# Patient Record
Sex: FEMALE | Race: WHITE | Employment: UNEMPLOYED | URBAN - METROPOLITAN AREA
[De-identification: names, ages, dates, MRNs, and addresses within clinical notes are randomized per-mention and may not be internally consistent; named-entity substitution may affect disease eponyms.]

---

## 2017-04-10 PROCEDURE — 87624 HPV HI-RISK TYP POOLED RSLT: CPT | Performed by: FAMILY MEDICINE

## 2017-04-10 PROCEDURE — 88175 CYTOPATH C/V AUTO FLUID REDO: CPT | Performed by: FAMILY MEDICINE

## 2020-12-03 PROBLEM — F33.1 MODERATE RECURRENT MAJOR DEPRESSION (HCC): Status: ACTIVE | Noted: 2020-12-03

## 2020-12-04 PROBLEM — F33.1 MAJOR DEPRESSIVE DISORDER, RECURRENT, MODERATE (HCC): Status: ACTIVE | Noted: 2020-12-03

## 2021-01-30 PROBLEM — F50.019 ANOREXIA NERVOSA, RESTRICTING TYPE: Status: ACTIVE | Noted: 2021-01-30

## 2021-01-30 PROBLEM — F50.01 ANOREXIA NERVOSA, RESTRICTING TYPE (HCC): Status: ACTIVE | Noted: 2021-01-30

## 2021-01-30 PROBLEM — F50.01 ANOREXIA NERVOSA, RESTRICTING TYPE: Status: ACTIVE | Noted: 2021-01-30

## 2023-01-05 PROBLEM — F33.2 MDD (MAJOR DEPRESSIVE DISORDER), RECURRENT EPISODE, SEVERE (HCC): Status: ACTIVE | Noted: 2023-01-05

## 2023-01-06 ENCOUNTER — APPOINTMENT (OUTPATIENT)
Dept: GENERAL RADIOLOGY | Facility: HOSPITAL | Age: 38
End: 2023-01-06
Attending: INTERNAL MEDICINE
Payer: COMMERCIAL

## 2023-01-06 PROCEDURE — 71046 X-RAY EXAM CHEST 2 VIEWS: CPT | Performed by: INTERNAL MEDICINE

## 2023-01-08 ENCOUNTER — HOSPITAL ENCOUNTER (OUTPATIENT)
Facility: HOSPITAL | Age: 38
Setting detail: OBSERVATION
Discharge: HOME | End: 2023-01-10
Attending: EMERGENCY MEDICINE | Admitting: INTERNAL MEDICINE
Payer: COMMERCIAL

## 2023-01-08 DIAGNOSIS — R45.851 SUICIDAL IDEATION: ICD-10-CM

## 2023-01-08 DIAGNOSIS — U07.1 COVID-19 VIRUS INFECTION: Primary | ICD-10-CM

## 2023-01-08 LAB — SARS-COV-2 RNA RESP QL NAA+PROBE: DETECTED

## 2023-01-08 RX ORDER — ENOXAPARIN SODIUM 100 MG/ML
40 INJECTION SUBCUTANEOUS DAILY
Status: DISCONTINUED | OUTPATIENT
Start: 2023-01-09 | End: 2023-01-10

## 2023-01-08 RX ORDER — QUETIAPINE FUMARATE 100 MG/1
200 TABLET, FILM COATED ORAL NIGHTLY
Status: DISCONTINUED | OUTPATIENT
Start: 2023-01-09 | End: 2023-01-10

## 2023-01-08 RX ORDER — BENZONATATE 100 MG/1
200 CAPSULE ORAL 3 TIMES DAILY PRN
Status: DISCONTINUED | OUTPATIENT
Start: 2023-01-08 | End: 2023-01-10

## 2023-01-08 RX ORDER — ONDANSETRON 2 MG/ML
4 INJECTION INTRAMUSCULAR; INTRAVENOUS EVERY 6 HOURS PRN
Status: DISCONTINUED | OUTPATIENT
Start: 2023-01-08 | End: 2023-01-10

## 2023-01-08 RX ORDER — QUETIAPINE FUMARATE 100 MG/1
200 TABLET, FILM COATED ORAL ONCE
Status: COMPLETED | OUTPATIENT
Start: 2023-01-08 | End: 2023-01-08

## 2023-01-08 RX ORDER — CLONAZEPAM 0.5 MG/1
1 TABLET ORAL ONCE
Status: COMPLETED | OUTPATIENT
Start: 2023-01-08 | End: 2023-01-08

## 2023-01-08 RX ORDER — SODIUM CHLORIDE 9 MG/ML
INJECTION, SOLUTION INTRAVENOUS CONTINUOUS
Status: DISCONTINUED | OUTPATIENT
Start: 2023-01-09 | End: 2023-01-09

## 2023-01-08 RX ORDER — ACETAMINOPHEN 500 MG
1000 TABLET ORAL EVERY 6 HOURS PRN
Status: DISCONTINUED | OUTPATIENT
Start: 2023-01-08 | End: 2023-01-10

## 2023-01-08 RX ORDER — CLONAZEPAM 1 MG/1
1 TABLET ORAL 2 TIMES DAILY PRN
Status: DISCONTINUED | OUTPATIENT
Start: 2023-01-08 | End: 2023-01-10

## 2023-01-08 RX ORDER — LAMOTRIGINE 100 MG/1
100 TABLET ORAL DAILY
Status: DISCONTINUED | OUTPATIENT
Start: 2023-01-09 | End: 2023-01-10

## 2023-01-08 RX ORDER — ESCITALOPRAM OXALATE 20 MG/1
20 TABLET ORAL DAILY
Status: DISCONTINUED | OUTPATIENT
Start: 2023-01-09 | End: 2023-01-09

## 2023-01-09 LAB
ALBUMIN SERPL-MCNC: 3.6 G/DL (ref 3.4–5)
ALBUMIN/GLOB SERPL: 1.2 {RATIO} (ref 1–2)
ALP LIVER SERPL-CCNC: 57 U/L
ALT SERPL-CCNC: 17 U/L
ANION GAP SERPL CALC-SCNC: 4 MMOL/L (ref 0–18)
AST SERPL-CCNC: 19 U/L (ref 15–37)
BASOPHILS # BLD AUTO: 0.02 X10(3) UL (ref 0–0.2)
BASOPHILS NFR BLD AUTO: 0.5 %
BILIRUB SERPL-MCNC: 0.7 MG/DL (ref 0.1–2)
BUN BLD-MCNC: 8 MG/DL (ref 7–18)
CALCIUM BLD-MCNC: 8.7 MG/DL (ref 8.5–10.1)
CHLORIDE SERPL-SCNC: 113 MMOL/L (ref 98–112)
CO2 SERPL-SCNC: 23 MMOL/L (ref 21–32)
CREAT BLD-MCNC: 0.69 MG/DL
EOSINOPHIL # BLD AUTO: 0.04 X10(3) UL (ref 0–0.7)
EOSINOPHIL NFR BLD AUTO: 0.9 %
ERYTHROCYTE [DISTWIDTH] IN BLOOD BY AUTOMATED COUNT: 12.2 %
GFR SERPLBLD BASED ON 1.73 SQ M-ARVRAT: 116 ML/MIN/1.73M2 (ref 60–?)
GLOBULIN PLAS-MCNC: 3.1 G/DL (ref 2.8–4.4)
GLUCOSE BLD-MCNC: 91 MG/DL (ref 70–99)
HCT VFR BLD AUTO: 38.4 %
HGB BLD-MCNC: 12.9 G/DL
IMM GRANULOCYTES # BLD AUTO: 0.02 X10(3) UL (ref 0–1)
IMM GRANULOCYTES NFR BLD: 0.5 %
LYMPHOCYTES # BLD AUTO: 1.28 X10(3) UL (ref 1–4)
LYMPHOCYTES NFR BLD AUTO: 29.8 %
MCH RBC QN AUTO: 33.4 PG (ref 26–34)
MCHC RBC AUTO-ENTMCNC: 33.6 G/DL (ref 31–37)
MCV RBC AUTO: 99.5 FL
MONOCYTES # BLD AUTO: 0.63 X10(3) UL (ref 0.1–1)
MONOCYTES NFR BLD AUTO: 14.7 %
NEUTROPHILS # BLD AUTO: 2.3 X10 (3) UL (ref 1.5–7.7)
NEUTROPHILS # BLD AUTO: 2.3 X10(3) UL (ref 1.5–7.7)
NEUTROPHILS NFR BLD AUTO: 53.6 %
OSMOLALITY SERPL CALC.SUM OF ELEC: 288 MOSM/KG (ref 275–295)
PLATELET # BLD AUTO: 217 10(3)UL (ref 150–450)
POTASSIUM SERPL-SCNC: 3.8 MMOL/L (ref 3.5–5.1)
PROT SERPL-MCNC: 6.7 G/DL (ref 6.4–8.2)
RBC # BLD AUTO: 3.86 X10(6)UL
SODIUM SERPL-SCNC: 140 MMOL/L (ref 136–145)
WBC # BLD AUTO: 4.3 X10(3) UL (ref 4–11)

## 2023-01-09 PROCEDURE — 90792 PSYCH DIAG EVAL W/MED SRVCS: CPT | Performed by: OTHER

## 2023-01-09 RX ORDER — DULOXETIN HYDROCHLORIDE 30 MG/1
30 CAPSULE, DELAYED RELEASE ORAL DAILY
Status: ON HOLD | COMMUNITY
End: 2023-01-09

## 2023-01-09 RX ORDER — LAMOTRIGINE 100 MG/1
100 TABLET, EXTENDED RELEASE ORAL DAILY
Qty: 30 TABLET | Refills: 0 | Status: SHIPPED | OUTPATIENT
Start: 2023-01-09

## 2023-01-09 RX ORDER — QUETIAPINE FUMARATE 200 MG/1
200 TABLET, FILM COATED ORAL NIGHTLY
Qty: 30 TABLET | Refills: 0 | Status: SHIPPED | OUTPATIENT
Start: 2023-01-09

## 2023-01-09 RX ORDER — DULOXETIN HYDROCHLORIDE 30 MG/1
30 CAPSULE, DELAYED RELEASE ORAL DAILY
Status: DISCONTINUED | OUTPATIENT
Start: 2023-01-09 | End: 2023-01-10

## 2023-01-09 RX ORDER — DULOXETIN HYDROCHLORIDE 30 MG/1
30 CAPSULE, DELAYED RELEASE ORAL DAILY
Qty: 30 CAPSULE | Refills: 0 | Status: SHIPPED | OUTPATIENT
Start: 2023-01-09

## 2023-01-09 NOTE — ED INITIAL ASSESSMENT (HPI)
Patient arrives to the ED from [facility] with c/o being COVID+ during her admission to [facility]. Patient c/o chills, body aches, fatigue, sore throat, difficulty sleeping. Symptoms first started approx 3-4 days. Patient on her unit was COVID+.

## 2023-01-09 NOTE — PLAN OF CARE
Patient A&O x4, lungs clear with a cough, c/o soreness to chest from coughing. PMH: Depression, Anxiety, ADHD, eating disorder, and SI. Patient admitted self to [facility] on 1/5 for SI, thoughts and plans, but no attempt at the time. Patient is pleasant and cooperative, she is upset for having to leave [facility] because she feels she was making progress and leaving mat effect her progress.

## 2023-01-09 NOTE — PROGRESS NOTES
Pt Covid+ on isolation precautions. Pt reports cough and some generalized body aches. Pt was tearful and crying because she feels her treatment at [facility] was ruined because she got Covid and now she won't get better. She feels isolated in room and wants to go home to her . Denies any intention for self harm. Klonopin given and spoke to patient about plan of care.

## 2023-01-10 VITALS
HEART RATE: 70 BPM | BODY MASS INDEX: 23.19 KG/M2 | TEMPERATURE: 98 F | DIASTOLIC BLOOD PRESSURE: 86 MMHG | HEIGHT: 68 IN | WEIGHT: 153 LBS | OXYGEN SATURATION: 97 % | RESPIRATION RATE: 18 BRPM | SYSTOLIC BLOOD PRESSURE: 118 MMHG

## 2023-01-10 PROCEDURE — 99232 SBSQ HOSP IP/OBS MODERATE 35: CPT | Performed by: OTHER

## 2023-01-10 RX ORDER — CLONAZEPAM 1 MG/1
1 TABLET ORAL ONCE
Status: DISCONTINUED | OUTPATIENT
Start: 2023-01-10 | End: 2023-01-10

## 2023-01-10 NOTE — BH PROGRESS NOTE
Called and spoke with the patient earlier about going to a [address]. She said, she will have to attend a virtual program the entire time because she doesn't have a car. She agreed to try and get her into a program with [facility].

## 2023-01-10 NOTE — PLAN OF CARE
Alert & oriented x4. VSS on room air. Voids. Tolerating regular diet. Ambulates independently. Patient updated on plan of care. Questions and concerns addressed.      Problem: Patient/Family Goals  Goal: Patient/Family Long Term Goal  Description: Patient's Long Term Goal: Discharge home    Interventions:  - Safety  precaution  -VSS  -Call light in reach  - See additional Care Plan goals for specific interventions  Outcome: Progressing  Goal: Patient/Family Short Term Goal  Description: Patient's Short Term Goal: Comfort    Interventions:   - Rounding  -Vss  - See additional Care Plan goals for specific interventions  Outcome: Progressing

## 2023-01-10 NOTE — DISCHARGE INSTRUCTIONS
You will be starting a virtual mental health program with [facility] starting tomorrow at 9am.  Their program runs from 9am-12pm Monday-Friday. A therapist from the office will be sending you the information today through your email. [facility]  [address] [place] [address]  [place], [address] [address]  [phone]    On 01/23/23 you will be having a virtual Psych assessment done with their PA, Nathalia Molina at 2:30pm.      If you have any question please call their office. Dr. Paxton Ambrocio is the psychiatrist you seen here. [facility]- [phone]    Suicide Prevention [facility]- [phone]    Please let your mental health providers, Psychiatrist, Dr. Maki Hodgson and therapist Dr. Gunner Louie know that you are in a virtual [address] [place] [address] with [facility]. They might want to change your scheduled appointments with them.

## 2023-01-10 NOTE — PROGRESS NOTES
NURSING DISCHARGE NOTE    Discharged Home via Wheelchair. Accompanied by Support staff  Belongings Taken by patient/family. VSS, tolerating diet, voiding adequately, pain controlled, tolerating ambulation. Discharge education provided. Reviewed medications and follow up appts. All questions answered and concerns addressed, pt verbalized understanding. IV removed. Pt dc in stable condition.  Patient left unit via wheelchair at [phone]

## 2023-01-10 NOTE — BH PROGRESS NOTE
Earlier left a couple messages with [facility]. Called and spoke with Dr. Re Krause and let her know that this patient needed a virtual program. This writer faxed over the needed psych consult and face sheet. Called and talked with Benjie with [facility]. She set the patient up with their virtual [address] program.  She said a therapist would be calling the patient later today through her email to start tomorrow and explain how to get on to their program.  The patient was informed and all the instructions were placed in the discharge summary. Dr. Leyla Parish and the patients nurse aware.

## 2023-01-10 NOTE — PLAN OF CARE
Pt is alert and orientedx4, vss  Denies n/v, no pain, no sob  Reports feeling better overall  Was able to eat 90% of ordered dinner   Meds per mar  Poc discussed, frequent rounding,call light in reach    Problem: Patient/Family Goals  Goal: Patient/Family Long Term Goal  Description: Patient's Long Term Goal: Discharge home    Interventions:  - Safety  precaution  -VSS  -Call light in reach  - See additional Care Plan goals for specific interventions  Outcome: Progressing  Goal: Patient/Family Short Term Goal  Description: Patient's Short Term Goal: Comfort    Interventions:   - Rounding  -Vss  - See additional Care Plan goals for specific interventions  Outcome: Progressing

## 2024-05-13 ENCOUNTER — HOSPITAL ENCOUNTER (EMERGENCY)
Age: 39
Discharge: HOME | End: 2024-05-13
Attending: EMERGENCY MEDICINE

## 2024-05-13 ENCOUNTER — APPOINTMENT (OUTPATIENT)
Dept: GENERAL RADIOLOGY | Age: 39
End: 2024-05-13
Attending: NURSE PRACTITIONER

## 2024-05-13 ENCOUNTER — APPOINTMENT (OUTPATIENT)
Dept: GENERAL RADIOLOGY | Age: 39
End: 2024-05-13
Attending: PHYSICIAN ASSISTANT

## 2024-05-13 ENCOUNTER — HOSPITAL ENCOUNTER (OUTPATIENT)
Age: 39
Discharge: ANOTHER HOSPITAL | End: 2024-05-13

## 2024-05-13 VITALS
OXYGEN SATURATION: 100 % | HEART RATE: 82 BPM | WEIGHT: 180 LBS | SYSTOLIC BLOOD PRESSURE: 130 MMHG | TEMPERATURE: 98 F | BODY MASS INDEX: 27 KG/M2 | RESPIRATION RATE: 18 BRPM | DIASTOLIC BLOOD PRESSURE: 77 MMHG

## 2024-05-13 VITALS
RESPIRATION RATE: 18 BRPM | OXYGEN SATURATION: 100 % | DIASTOLIC BLOOD PRESSURE: 60 MMHG | TEMPERATURE: 98 F | HEART RATE: 74 BPM | WEIGHT: 180 LBS | HEIGHT: 68 IN | SYSTOLIC BLOOD PRESSURE: 110 MMHG | BODY MASS INDEX: 27.28 KG/M2

## 2024-05-13 DIAGNOSIS — M21.932: ICD-10-CM

## 2024-05-13 DIAGNOSIS — S69.92XA INJURY OF LEFT WRIST, INITIAL ENCOUNTER: ICD-10-CM

## 2024-05-13 DIAGNOSIS — S52.614A CLOSED NONDISPLACED FRACTURE OF STYLOID PROCESS OF RIGHT ULNA, INITIAL ENCOUNTER: ICD-10-CM

## 2024-05-13 DIAGNOSIS — S52.572A OTHER CLOSED INTRA-ARTICULAR FRACTURE OF DISTAL END OF LEFT RADIUS, INITIAL ENCOUNTER: Primary | ICD-10-CM

## 2024-05-13 DIAGNOSIS — S52.612A CLOSED DISPLACED FRACTURE OF STYLOID PROCESS OF LEFT ULNA, INITIAL ENCOUNTER: ICD-10-CM
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
## 2024-05-13 DIAGNOSIS — S52.592A OTHER CLOSED FRACTURE OF DISTAL END OF LEFT RADIUS, INITIAL ENCOUNTER: Primary | ICD-10-CM

## 2024-05-13 PROCEDURE — 73110 X-RAY EXAM OF WRIST: CPT | Performed by: NURSE PRACTITIONER

## 2024-05-13 PROCEDURE — 25605 CLTX DST RDL FX/EPHYS SEP W/: CPT

## 2024-05-13 PROCEDURE — 96361 HYDRATE IV INFUSION ADD-ON: CPT

## 2024-05-13 PROCEDURE — A4565 SLINGS: HCPCS | Performed by: NURSE PRACTITIONER

## 2024-05-13 PROCEDURE — 99285 EMERGENCY DEPT VISIT HI MDM: CPT

## 2024-05-13 PROCEDURE — 29125 APPL SHORT ARM SPLINT STATIC: CPT | Performed by: NURSE PRACTITIONER

## 2024-05-13 PROCEDURE — 99152 MOD SED SAME PHYS/QHP 5/>YRS: CPT

## 2024-05-13 PROCEDURE — 73110 X-RAY EXAM OF WRIST: CPT | Performed by: PHYSICIAN ASSISTANT

## 2024-05-13 PROCEDURE — 99205 OFFICE O/P NEW HI 60 MIN: CPT | Performed by: NURSE PRACTITIONER

## 2024-05-13 PROCEDURE — S0119 ONDANSETRON 4 MG: HCPCS | Performed by: NURSE PRACTITIONER

## 2024-05-13 PROCEDURE — 96374 THER/PROPH/DIAG INJ IV PUSH: CPT

## 2024-05-13 RX ORDER — MORPHINE SULFATE 4 MG/ML
4 INJECTION, SOLUTION INTRAMUSCULAR; INTRAVENOUS ONCE
Status: COMPLETED | OUTPATIENT
Start: 2024-05-13 | End: 2024-05-13

## 2024-05-13 RX ORDER — ONDANSETRON 4 MG/1
4 TABLET, ORALLY DISINTEGRATING ORAL ONCE
Status: COMPLETED | OUTPATIENT
Start: 2024-05-13 | End: 2024-05-13

## 2024-05-13 RX ORDER — HYDROCODONE BITARTRATE AND ACETAMINOPHEN 5; 325 MG/1; MG/1
1-2 TABLET ORAL EVERY 6 HOURS PRN
Qty: 4 TABLET | Refills: 0 | Status: SHIPPED | OUTPATIENT
Start: 2024-05-13 | End: 2024-05-18

## 2024-05-13 RX ORDER — HYDROCODONE BITARTRATE AND ACETAMINOPHEN 5; 325 MG/1; MG/1
1 TABLET ORAL ONCE
Status: COMPLETED | OUTPATIENT
Start: 2024-05-13 | End: 2024-05-13

## 2024-05-13 NOTE — ED PROVIDER NOTES
Patient Seen in: [facility] [place]      History     Chief Complaint   Patient presents with    Arm or Hand Injury     Stated Complaint: left wrist deformity, rollerblading    Subjective:   HPI    38-year-old female.  Right-hand-dominant.  Sent from [facility] for reduction of a displaced distal radius fracture.  Patient arrives in a short arm splint.    Objective:   Past Medical History:    ADHD    ADD    ANXIETY    BACK PAIN    DEPRESSION              Past Surgical History:   Procedure Laterality Date    [place] teeth removed Bilateral 01/01/2010                Social History     Socioeconomic History    Marital status:    Tobacco Use    Smoking status: Former     Current packs/day: 0.00     Types: Cigarettes     Quit date: 1/1/2014     Years since quitting: 10.3    Smokeless tobacco: Never   Vaping Use    Vaping status: Never Used   Substance and Sexual Activity    Alcohol use: No     Comment: Pt hasn't drank in 6 months    Drug use: Yes     Frequency: 7.0 times per week     Types: Cannabis     Comment: Multiple times per day    Sexual activity: Yes     Partners: Male              Review of Systems    Positive for stated complaint: left wrist deformity, rollerblading  Other systems are as noted in HPI.  Constitutional and vital signs reviewed.      All other systems reviewed and negative except as noted above.    Physical Exam     ED Triage Vitals [05/13/24 1726]   /77   Pulse 75   Resp 18   Temp 98 °F (36.7 °C)   Temp src    SpO2 99 %   O2 Device None (Room air)       Current Vitals:   Vital Signs  BP: 110/60  Pulse: 74  Resp: 18  Temp: 98 °F (36.7 °C)    Oxygen Therapy  SpO2: 100 %  O2 Device: None (Room air)  ETCO2 (mmHg): 28 mmHg            Physical Exam    Gen: Well appearing, well groomed, alert and aware x 3  Neck: Supple, full range of motion  Eye examination: EOMs are intact, normal conjunctival  ENT: Atraumatic  Lung: No distress, RR, no  retraction  Extremities: Patient arrives in a short arm splint.  Dinner fork deformity.  Maintains a strong radial pulse  Back: Full range of motion  Skin: No sign of trauma, Skin warm and dry, no induration or sign of infection.   Neuro: Cranial nerves intact (taste and smell omited), Normal Gait.Extremity strength is 5/5 and equal bilaterally. Sensation is equal bilaterally.    ED Course   Labs Reviewed - No data to display                   MDM        My supervising physician was involved in the management of this patient.      Previous visit and x-ray reviewed.  Case discussed with on-call orthopedics.  Will plan on a conscious sedation.  Please see supervising physician's documentation.  Will place in a sugar-tong splint and plan on orthopedic follow-up in the next 48 hours        Sugar-tong splint checked by PA and found to be neurovascularly intact.  Sling provided.    CONCLUSION:  There is interval improvement in alignment of the comminuted distal radius fracture.  Displaced ulnar styloid fracture is again noted.   LOCATION:  [facility]   Dictated by (CST): Hipolito Tsang MD on 5/13/2024 at 6:10 PM     Finalized by (CST): Hipolito Tsang MD on 5/13/2024 at 6:11 PM         Improvement as above.         Patient observed for the required timeframe.  Alert and oriented time of discharge.  Neurovascularly intact    Medical Decision Making      Disposition and Plan     Clinical Impression:  1. Other closed fracture of distal end of left radius, initial encounter    2. Closed displaced fracture of styloid process of left ulna, initial encounter         Disposition:  There is no disposition on file for this visit.  There is no disposition time on file for this visit.    Follow-up:  Harshad Price MD  [address] [place] [address]  [address]  [facility] [id number] [phone]    Follow up            Medications Prescribed:  Current Discharge Medication List

## 2024-05-13 NOTE — ED PROVIDER NOTES
Patient Seen in: [facility]      History     Chief Complaint   Patient presents with    Arm or Hand Injury     Left wrist deformity     Stated Complaint: left arm wrist injury/sport injury    Subjective:   HPI    38-year-old female presents today with complaints of left wrist pain after she fell on concrete while rollerblading. Patient states she cannot move her arm due to pain. Patient is right hand dominant.     Objective:   Past Medical History:    ADHD    ADD    ANXIETY    BACK PAIN    DEPRESSION              Past Surgical History:   Procedure Laterality Date    [place] teeth removed Bilateral 01/01/2010                Social History     Socioeconomic History    Marital status:    Tobacco Use    Smoking status: Former     Current packs/day: 0.00     Types: Cigarettes     Quit date: 1/1/2014     Years since quitting: 10.3    Smokeless tobacco: Never   Vaping Use    Vaping status: Never Used   Substance and Sexual Activity    Alcohol use: No     Comment: Pt hasn't drank in 6 months    Drug use: Yes     Frequency: 7.0 times per week     Types: Cannabis     Comment: Multiple times per day    Sexual activity: Yes     Partners: Male              Review of Systems   Constitutional: Negative.    HENT: Negative.     Eyes: Negative.    Respiratory: Negative.     Cardiovascular: Negative.    Gastrointestinal: Negative.    Endocrine: Negative.    Genitourinary: Negative.    Musculoskeletal:  Positive for arthralgias and joint swelling.   Skin: Negative.    Allergic/Immunologic: Negative.    Neurological: Negative.    Hematological: Negative.    Psychiatric/Behavioral: Negative.         Positive for stated complaint: left arm wrist injury/sport injury  Other systems are as noted in HPI.  Constitutional and vital signs reviewed.      All other systems reviewed and negative except as noted above.    Physical Exam     ED Triage Vitals   BP 05/13/24 1621 130/77   Pulse 05/13/24 1621 82   Resp 05/13/24 1621 18    Temp 05/13/24 1621 98 °F (36.7 °C)   Temp src 05/13/24 1621 Temporal   SpO2 05/13/24 1621 100 %   O2 Device 05/13/24 1620 None (Room air)       Current Vitals:   Vital Signs  BP: 130/77  Pulse: 82  Resp: 18  Temp: 98 °F (36.7 °C)  Temp src: Temporal    Oxygen Therapy  SpO2: 100 %  O2 Device: None (Room air)            Physical Exam  Vitals and nursing note reviewed.   Constitutional:       Appearance: Normal appearance. She is normal weight.   HENT:      Head: Normocephalic.      Right Ear: External ear normal.      Left Ear: External ear normal.   Eyes:      Extraocular Movements: Extraocular movements intact.      Conjunctiva/sclera: Conjunctivae normal.      Pupils: Pupils are equal, round, and reactive to light.   Cardiovascular:      Rate and Rhythm: Normal rate and regular rhythm.      Pulses: Normal pulses.      Heart sounds: Normal heart sounds.   Pulmonary:      Effort: Pulmonary effort is normal.   Musculoskeletal:         General: Swelling, tenderness, deformity and signs of injury present.      Comments: Left deformity to the wrist noted over the distal radius.  CMS intact.   Skin:     General: Skin is warm.      Capillary Refill: Capillary refill takes less than 2 seconds.   Neurological:      General: No focal deficit present.      Mental Status: She is alert.   Psychiatric:         Mood and Affect: Mood normal.             ED Course   Labs Reviewed - No data to display  CMS intact after short arm splint applied.               MDM      38-year-old female presents today with complaints of left wrist pain after she fell on concrete.  Patient states she cannot move her arm due to pain.  Vital signs: Please see EMR.  Physical exam: Please see exam.  Differential diagnosis: Fracture, displaced distal radius fracture.  I personally reviewed the x-ray of the left wrist.  Commuted displaced distal radius fracture and styloid ulna fracture appreciated.  Awaiting formal radiology read.  Patient placed in a  short arm splint for transportation.  Perfect served and consulted orthopedics and it was recommended that even if this is a surgical case she can follow-up as an outpatient with one of his partners.  Report given to the [place] [facility].   agreed to drive directly to the emergency room.  Note to Patient  The 21st Century Cures Act makes medical notes like these available to patients in the interest of transparency. However, be advised this is a medical document and is intended as upet-sy-juvg communication; it is written in medical language and may appear blunt, direct, or contain abbreviations or verbiage that are unfamiliar. Medical documents are intended to carry relevant information, facts as evident, and the clinical opinion of the practitioner.     This report has been produced using speech recognition software, and may contain errors related to grammar, punctuation, spelling, words or phrases unrecognized or not translated appropriately to text; these errors may be referred to the dictating provider for further clarification and/or addendum as needed.                                     Medical Decision Making  38-year-old female presents today with complaints of left wrist pain after she fell on concrete.  Patient states she cannot move her arm due to pain.      Problems Addressed:  Closed nondisplaced fracture of styloid process of right ulna, initial encounter: acute illness or injury  Deformity of wrist joint, left: acute illness or injury  Fall, initial encounter: acute illness or injury  Injury of left wrist, initial encounter: acute illness or injury  Other closed intra-articular fracture of distal end of left radius, initial encounter: acute illness or injury    Amount and/or Complexity of Data Reviewed  Radiology: ordered. Decision-making details documented in ED Course.  ECG/medicine tests: ordered. Decision-making details documented in ED Course.    Risk  Decision regarding  hospitalization.        Disposition and Plan     Clinical Impression:  1. Other closed intra-articular fracture of distal end of left radius, initial encounter    2. Injury of left wrist, initial encounter    3. Fall, initial encounter    4. Deformity of wrist joint, left    5. Closed nondisplaced fracture of styloid process of right ulna, initial encounter         Disposition:  Ic to ed  5/13/2024  4:50 pm    Follow-up:  Harshad Price MD  [address] [place], [place] [address]   [address]  [facility] [address]  [phone]    In 1 week  ER follow up          Medications Prescribed:  Current Discharge Medication List

## 2024-05-13 NOTE — RESPIRATORY THERAPY NOTE
PT ON ETCO2 NC with 3 lpm o2 for conscious sedation left wrist reduction. MD at beside. VSS throughout procedure, HR 70-80, SpO2 95-98%, ETCO2 38-34. Pt rito sedation and procedure well.

## 2024-11-02 ENCOUNTER — APPOINTMENT (OUTPATIENT)
Dept: GENERAL RADIOLOGY | Age: 39
End: 2024-11-02
Attending: NURSE PRACTITIONER
Payer: COMMERCIAL

## 2024-11-02 ENCOUNTER — HOSPITAL ENCOUNTER (OUTPATIENT)
Age: 39
Discharge: HOME | End: 2024-11-02
Payer: COMMERCIAL

## 2024-11-02 VITALS
TEMPERATURE: 98 F | HEIGHT: 68 IN | OXYGEN SATURATION: 97 % | SYSTOLIC BLOOD PRESSURE: 123 MMHG | DIASTOLIC BLOOD PRESSURE: 85 MMHG | WEIGHT: 180 LBS | RESPIRATION RATE: 20 BRPM | HEART RATE: 88 BPM | BODY MASS INDEX: 27.28 KG/M2

## 2024-11-02 DIAGNOSIS — S00.33XA CONTUSION OF NOSE, INITIAL ENCOUNTER: ICD-10-CM
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
## 2024-11-02 DIAGNOSIS — S05.11XA PERIORBITAL CONTUSION OF RIGHT EYE, INITIAL ENCOUNTER: ICD-10-CM

## 2024-11-02 DIAGNOSIS — S52.122A CLOSED DISPLACED FRACTURE OF HEAD OF LEFT RADIUS, INITIAL ENCOUNTER: ICD-10-CM

## 2024-11-02 PROCEDURE — 99213 OFFICE O/P EST LOW 20 MIN: CPT | Performed by: NURSE PRACTITIONER

## 2024-11-02 PROCEDURE — 73080 X-RAY EXAM OF ELBOW: CPT | Performed by: NURSE PRACTITIONER

## 2024-11-02 PROCEDURE — A4565 SLINGS: HCPCS | Performed by: NURSE PRACTITIONER

## 2024-11-02 PROCEDURE — 73030 X-RAY EXAM OF SHOULDER: CPT | Performed by: NURSE PRACTITIONER

## 2024-11-02 PROCEDURE — 29105 APPLICATION LONG ARM SPLINT: CPT | Performed by: NURSE PRACTITIONER

## 2024-11-02 RX ORDER — LEVONORGESTREL/ETHIN.ESTRADIOL 0.1-0.02MG
1 TABLET ORAL DAILY
COMMUNITY
Start: 2023-09-02

## 2024-11-02 RX ORDER — PRAZOSIN HYDROCHLORIDE 2 MG/1
2 CAPSULE ORAL NIGHTLY
COMMUNITY
Start: 2024-10-10 | End: 2025-01-08

## 2024-11-02 RX ORDER — TRAZODONE HYDROCHLORIDE 100 MG/1
100 TABLET ORAL NIGHTLY
COMMUNITY

## 2024-11-02 NOTE — ED PROVIDER NOTES
Patient Seen in: [facility]      History     Chief Complaint   Patient presents with    Eye Problem    Arm Injury     Stated Complaint: Elbow pain/eye pain    Subjective:   39 yo female presents to the immediate care with c/o fall.  Patient states she fell yesterday onto her left side on a concrete floor.  Patient admits that at the time she was abusing opioids, benzos, and marijuana.  She has a history of drug use/abuse in the past.  She was wearing her glasses when she fell, and her glasses broke causing some swelling and bruising around her right eye.  Her main complaint is left arm pain.  She states she is unable to move the arm due to the pain.  She has not taken anything for pain prior to arrival.  She had wrist surgery on this same arm earlier this year.  She denies any LOC, visual changes, eye redness, eye pain, headache, dizziness, nausea, vomiting, numbness or tingling.     The history is provided by the patient.         Objective:     Past Medical History:    ADHD    ADD    ANXIETY    BACK PAIN    DEPRESSION              Past Surgical History:   Procedure Laterality Date    [place] teeth removed Bilateral 01/01/2010    Wrist fracture surgery Left                 Social History     Socioeconomic History    Marital status:    Tobacco Use    Smoking status: Former     Current packs/day: 0.00     Types: Cigarettes     Quit date: 1/1/2014     Years since quitting: 10.8    Smokeless tobacco: Never   Vaping Use    Vaping status: Never Used   Substance and Sexual Activity    Alcohol use: Yes     Alcohol/week: 16.0 standard drinks of alcohol     Types: 16 Shots of liquor per week     Comment: last week    Drug use: Yes     Frequency: 7.0 times per week     Types: Cannabis     Comment: Multiple times per day/opiods    Sexual activity: Yes     Partners: Male              Review of Systems   Constitutional: Negative.    HENT:  Positive for facial swelling. Negative for nosebleeds.    Eyes:   Negative for photophobia, pain, discharge, redness, itching and visual disturbance.        Right eye bruising and laceration.    Musculoskeletal:  Positive for arthralgias, joint swelling and myalgias. Negative for neck pain and neck stiffness.   Skin:  Positive for color change and wound.   Neurological: Negative.  Negative for dizziness, syncope, weakness, light-headedness and headaches.   All other systems reviewed and are negative.      Positive for stated complaint: Elbow pain/eye pain  Other systems are as noted in HPI.  Constitutional and vital signs reviewed.      All other systems reviewed and negative except as noted above.    Physical Exam     ED Triage Vitals [11/02/24 0855]   /85   Pulse 88   Resp 20   Temp 98 °F (36.7 °C)   Temp src Temporal   SpO2 97 %   O2 Device None (Room air)       Current Vitals:   Vital Signs  BP: 123/85  Pulse: 88  Resp: 20  Temp: 98 °F (36.7 °C)  Temp src: Temporal    Oxygen Therapy  SpO2: 97 %  O2 Device: None (Room air)        Physical Exam  Vitals and nursing note reviewed.   Constitutional:       General: She is not in acute distress.     Appearance: Normal appearance. She is normal weight. She is not ill-appearing.   HENT:      Head: Normocephalic.      Nose:      Comments: Small area of ecchymosis and mild edema to bridge of nose.      Mouth/Throat:      Mouth: Mucous membranes are moist.      Pharynx: Oropharynx is clear.   Eyes:      General: Vision grossly intact. Gaze aligned appropriately.      Extraocular Movements: Extraocular movements intact.      Conjunctiva/sclera: Conjunctivae normal.      Pupils: Pupils are equal, round, and reactive to light.      Comments: Right periorbit is mildly erythematous, edematous, and ecchymotic.  There is a superficial laceration to the lower eyelid.  No active bleeding.     Cardiovascular:      Rate and Rhythm: Normal rate and regular rhythm.      Pulses: Normal pulses.      Heart sounds: Normal heart sounds.   Pulmonary:       Effort: Pulmonary effort is normal. No respiratory distress.      Breath sounds: Normal breath sounds.   Musculoskeletal:      Cervical back: Normal range of motion and neck supple. No tenderness.      Comments: Tenderness primarily  to the left elbow.  This is edematous.  No obvious  deformity or dislocation.  ROM is limited due to pain.  There is also some more milder tenderness to the left shoulder with palpation.  No dislocation or deformity.  Motor strength  and sensation intact.  No pain to the forearm, wrist, or hand.  Cap refill <2 sec and radial pulse is 2+.    Neurological:      Mental Status: She is alert.           ED Course   Labs Reviewed - No data to display       MDM          Medical Decision Making  37 yo female with fall that occurred yesterday.  There is a right periorbital contusion and nasal contusion.  Do not feel that a CT is necessary.  There is no entrapment or pain with palpation of orbit or nose.  X-rays of left elbow and shoulder ordered.      X-rays as read by radiology shows a mildly displaced radial head fracture of the elbow.  The shoulder is normal.  Will place patient in a long-arm splint with a sling for immobilization.  Patient has neurovascularly intact.  Did discuss with patient that due to her history of drug use and abuse I do not feel comfortable with prescribing her opioid medication to help with pain control.  Recommended use of Tylenol, ibuprofen, and ice instead.  Patient has seen hand orthopedics recently for her wrist, it was recommended she follow-up with Dr. Garcia as she is still a current patient.      Splint was assessed post application and CMS is intact.    Amount and/or Complexity of Data Reviewed  Radiology: ordered. Decision-making details documented in ED Course.    Risk  OTC drugs.        Disposition and Plan     Clinical Impression:  1. Fall, initial encounter    2. Closed displaced fracture of head of left radius, initial encounter    3. Periorbital  contusion of right eye, initial encounter    4. Contusion of nose, initial encounter         Disposition:  Discharge  11/2/2024 10:02 am    Follow-up:  Chang Garcia MD  [address]  [address] [address] [phone]    In 3 days            Medications Prescribed:  Current Discharge Medication List              Supplementary Documentation:

## 2024-11-02 NOTE — ED INITIAL ASSESSMENT (HPI)
Pt presents with redness and bruising to her right eye and bridge of nose. Has left arm pain, unable to move her arm without pain and notes pain is worse at the elbow. Pt fell yesterday to her left side on a concrete floor. Pt reports using opiods and marijuana before fall.

## 2024-11-02 NOTE — DISCHARGE INSTRUCTIONS
Rest, ice and elevate as much as possible over the next few days.   Wear the splint as instructed. Do not get the splint wet as it will disintegrate.  Use the sling for support.   Take Tylenol 1000 mg every 6 hours and/or ibuprofen 600 mg every 6 hours as needed for pain control.   Follow up with Dr. Garcia for orthopedics in the next 3-4 days.     Thank you for choosing [facility] for your care.

## 2024-12-23 NOTE — BH PROGRESS NOTE
Left a message with [facility] about needing a virtual IOP for the patient. No call back yet. Will call them in the am to set up for Wednesday. Fall with Harm Risk

## 2025-03-25 PROBLEM — F33.2 MAJOR DEPRESSIVE DISORDER, RECURRENT, SEVERE W/O PSYCHOTIC BEHAVIOR (HCC): Status: ACTIVE | Noted: 2025-03-25

## 2025-03-27 ENCOUNTER — ANESTHESIA EVENT (OUTPATIENT)
Dept: POSTOP/PACU | Facility: HOSPITAL | Age: 40
End: 2025-03-27
Payer: COMMERCIAL

## 2025-03-27 ENCOUNTER — ANESTHESIA (OUTPATIENT)
Dept: POSTOP/PACU | Facility: HOSPITAL | Age: 40
End: 2025-03-27
Payer: COMMERCIAL

## 2025-03-27 ENCOUNTER — HOSPITAL ENCOUNTER (OUTPATIENT)
Dept: POSTOP/PACU | Facility: HOSPITAL | Age: 40
Discharge: HOME | End: 2025-03-27
Attending: Other
Payer: COMMERCIAL

## 2025-03-27 VITALS
OXYGEN SATURATION: 99 % | HEIGHT: 68 IN | SYSTOLIC BLOOD PRESSURE: 121 MMHG | RESPIRATION RATE: 21 BRPM | TEMPERATURE: 99 F | HEART RATE: 69 BPM | DIASTOLIC BLOOD PRESSURE: 83 MMHG | BODY MASS INDEX: 30.95 KG/M2 | WEIGHT: 204.19 LBS

## 2025-03-27 DIAGNOSIS — F33.2 MAJOR DEPRESSIVE DISORDER, RECURRENT, SEVERE W/O PSYCHOTIC BEHAVIOR (HCC): ICD-10-CM

## 2025-03-27 RX ORDER — CAFFEINE AND SODIUM BENZOATE 125 MG/ML
125 INJECTION, SOLUTION INTRAMUSCULAR; INTRAVENOUS ONCE
Status: COMPLETED | OUTPATIENT
Start: 2025-03-27 | End: 2025-03-27

## 2025-03-27 RX ORDER — ETOMIDATE 2 MG/ML
INJECTION INTRAVENOUS AS NEEDED
Status: DISCONTINUED | OUTPATIENT
Start: 2025-03-27 | End: 2025-03-27 | Stop reason: SURG

## 2025-03-27 RX ORDER — GLYCOPYRROLATE 0.2 MG/ML
INJECTION, SOLUTION INTRAMUSCULAR; INTRAVENOUS
Status: COMPLETED
Start: 2025-03-27 | End: 2025-03-27

## 2025-03-27 RX ORDER — SODIUM CHLORIDE, SODIUM LACTATE, POTASSIUM CHLORIDE, CALCIUM CHLORIDE 600; 310; 30; 20 MG/100ML; MG/100ML; MG/100ML; MG/100ML
INJECTION, SOLUTION INTRAVENOUS CONTINUOUS
Status: DISCONTINUED | OUTPATIENT
Start: 2025-03-27 | End: 2025-03-27 | Stop reason: HOSPADM

## 2025-03-27 RX ORDER — HYDROMORPHONE HYDROCHLORIDE 1 MG/ML
0.2 INJECTION, SOLUTION INTRAMUSCULAR; INTRAVENOUS; SUBCUTANEOUS EVERY 5 MIN PRN
Status: DISCONTINUED | OUTPATIENT
Start: 2025-03-27 | End: 2025-03-27 | Stop reason: HOSPADM

## 2025-03-27 RX ORDER — ONDANSETRON 2 MG/ML
INJECTION INTRAMUSCULAR; INTRAVENOUS
Status: COMPLETED
Start: 2025-03-27 | End: 2025-03-27

## 2025-03-27 RX ORDER — ONDANSETRON 2 MG/ML
4 INJECTION INTRAMUSCULAR; INTRAVENOUS ONCE
Status: COMPLETED | OUTPATIENT
Start: 2025-03-27 | End: 2025-03-27

## 2025-03-27 RX ORDER — KETAMINE HYDROCHLORIDE 50 MG/ML
INJECTION, SOLUTION INTRAMUSCULAR; INTRAVENOUS AS NEEDED
Status: DISCONTINUED | OUTPATIENT
Start: 2025-03-27 | End: 2025-03-27 | Stop reason: SURG

## 2025-03-27 RX ORDER — CAFFEINE AND SODIUM BENZOATE 125 MG/ML
INJECTION, SOLUTION INTRAMUSCULAR; INTRAVENOUS
Status: COMPLETED
Start: 2025-03-27 | End: 2025-03-27

## 2025-03-27 RX ORDER — HYDROMORPHONE HYDROCHLORIDE 1 MG/ML
0.6 INJECTION, SOLUTION INTRAMUSCULAR; INTRAVENOUS; SUBCUTANEOUS EVERY 5 MIN PRN
Status: DISCONTINUED | OUTPATIENT
Start: 2025-03-27 | End: 2025-03-27 | Stop reason: HOSPADM

## 2025-03-27 RX ORDER — GLYCOPYRROLATE 0.2 MG/ML
0.1 INJECTION, SOLUTION INTRAMUSCULAR; INTRAVENOUS ONCE
Status: COMPLETED | OUTPATIENT
Start: 2025-03-27 | End: 2025-03-27

## 2025-03-27 RX ORDER — SODIUM CHLORIDE, SODIUM LACTATE, POTASSIUM CHLORIDE, CALCIUM CHLORIDE 600; 310; 30; 20 MG/100ML; MG/100ML; MG/100ML; MG/100ML
INJECTION, SOLUTION INTRAVENOUS CONTINUOUS
Status: DISCONTINUED | OUTPATIENT
Start: 2025-03-27 | End: 2025-03-29

## 2025-03-27 RX ORDER — KETOROLAC TROMETHAMINE 30 MG/ML
INJECTION, SOLUTION INTRAMUSCULAR; INTRAVENOUS
Status: COMPLETED
Start: 2025-03-27 | End: 2025-03-27

## 2025-03-27 RX ORDER — KETOROLAC TROMETHAMINE 30 MG/ML
15 INJECTION, SOLUTION INTRAMUSCULAR; INTRAVENOUS ONCE
Status: COMPLETED | OUTPATIENT
Start: 2025-03-27 | End: 2025-03-27

## 2025-03-27 RX ORDER — NALOXONE HYDROCHLORIDE 0.4 MG/ML
0.08 INJECTION, SOLUTION INTRAMUSCULAR; INTRAVENOUS; SUBCUTANEOUS AS NEEDED
Status: DISCONTINUED | OUTPATIENT
Start: 2025-03-27 | End: 2025-03-27 | Stop reason: HOSPADM

## 2025-03-27 RX ORDER — HYDROMORPHONE HYDROCHLORIDE 1 MG/ML
0.4 INJECTION, SOLUTION INTRAMUSCULAR; INTRAVENOUS; SUBCUTANEOUS EVERY 5 MIN PRN
Status: DISCONTINUED | OUTPATIENT
Start: 2025-03-27 | End: 2025-03-27 | Stop reason: HOSPADM

## 2025-03-27 RX ADMIN — KETAMINE HYDROCHLORIDE 25 MG: 50 INJECTION, SOLUTION INTRAMUSCULAR; INTRAVENOUS at 06:56:00

## 2025-03-27 RX ADMIN — SODIUM CHLORIDE, SODIUM LACTATE, POTASSIUM CHLORIDE, CALCIUM CHLORIDE: 600; 310; 30; 20 INJECTION, SOLUTION INTRAVENOUS at 06:19:00

## 2025-03-27 RX ADMIN — ONDANSETRON 4 MG: 2 INJECTION INTRAMUSCULAR; INTRAVENOUS at 06:19:00

## 2025-03-27 RX ADMIN — ETOMIDATE 16 MG: 2 INJECTION INTRAVENOUS at 06:56:00

## 2025-03-27 RX ADMIN — SODIUM CHLORIDE, SODIUM LACTATE, POTASSIUM CHLORIDE, CALCIUM CHLORIDE: 600; 310; 30; 20 INJECTION, SOLUTION INTRAVENOUS at 07:15:00

## 2025-03-27 RX ADMIN — SODIUM CHLORIDE, SODIUM LACTATE, POTASSIUM CHLORIDE, CALCIUM CHLORIDE: 600; 310; 30; 20 INJECTION, SOLUTION INTRAVENOUS at 07:06:00

## 2025-03-27 RX ADMIN — SODIUM CHLORIDE, SODIUM LACTATE, POTASSIUM CHLORIDE, CALCIUM CHLORIDE: 600; 310; 30; 20 INJECTION, SOLUTION INTRAVENOUS at 06:55:00

## 2025-03-27 RX ADMIN — GLYCOPYRROLATE 0.1 MG: 0.2 INJECTION, SOLUTION INTRAMUSCULAR; INTRAVENOUS at 06:19:00

## 2025-03-27 RX ADMIN — CAFFEINE AND SODIUM BENZOATE 125 MG: 125 INJECTION, SOLUTION INTRAMUSCULAR; INTRAVENOUS at 06:35:00

## 2025-03-27 RX ADMIN — KETOROLAC TROMETHAMINE 15 MG: 30 INJECTION, SOLUTION INTRAMUSCULAR; INTRAVENOUS at 06:19:00

## 2025-03-27 NOTE — ANESTHESIA POSTPROCEDURE EVALUATION
[facility]    Ana Morales Patient Status:  Outpatient   Age/Gender 39 year old female MRN [id number]   Location [facility] POST ANESTHESIA CARE UNIT Attending Luis Alberto Casas MD   Hosp Day # 0 PCP None Pcp       Anesthesia Post-op Note        Procedure Summary       Date: 03/27/25 Room / Location: [facility] Post Anesthesia Care Unit    Anesthesia Start: 0654 Anesthesia Stop: 0706    Procedure: ECT(BEDSIDE) Diagnosis: Major depressive disorder, recurrent, severe w/o psychotic behavior (HCC)    Scheduled Providers:  Anesthesiologist: Ismael Palomo MD    Anesthesia Type: general ASA Status: 2            Anesthesia Type: general    Vitals Value Taken Time   /103 03/27/25 0706   Temp 98.3 °F (36.8 °C) 03/27/25 0706   Pulse 89 03/27/25 0706   Resp 16 03/27/25 0706   SpO2 99 % 03/27/25 0706           Patient Location: PACU    Anesthesia Type: general    Airway Patency: patent    Postop Pain Control: adequate    Mental Status: mildly sedated but able to meaningfully participate in the post-anesthesia evaluation    Nausea/Vomiting: none    Cardiopulmonary/Hydration status: stable euvolemic    Complications: no apparent anesthesia related complications    Postop vital signs: stable    Dental Exam: Unchanged from Preop

## 2025-03-27 NOTE — ANESTHESIA POSTPROCEDURE EVALUATION
[facility]    Ana Morales Patient Status:  Outpatient   Age/Gender 39 year old female MRN [id number]   Location [facility] POST ANESTHESIA CARE UNIT Attending Luis Alberto Casas MD   Hosp Day # 0 PCP None Pcp       Anesthesia Post-op Note        Procedure Summary       Date: 03/27/25 Room / Location: [facility] Post Anesthesia Care Unit    Anesthesia Start: 0654 Anesthesia Stop: 0706    Procedure: ECT(BEDSIDE) Diagnosis: Major depressive disorder, recurrent, severe w/o psychotic behavior (HCC)    Scheduled Providers:  Anesthesiologist: Ismael Palomo MD    Anesthesia Type: general ASA Status: 2            Anesthesia Type: general    Vitals Value Taken Time   /103 03/27/25 0706   Temp 98.3 °F (36.8 °C) 03/27/25 0706   Pulse 89 03/27/25 0706   Resp 16 03/27/25 0706   SpO2 99 % 03/27/25 0706           Patient Location: PACU    Anesthesia Type: general    Airway Patency: patent    Postop Pain Control: adequate    Mental Status: mildly sedated but able to meaningfully participate in the post-anesthesia evaluation    Nausea/Vomiting: none    Cardiopulmonary/Hydration status: stable euvolemic    Complications: no apparent anesthesia related complications    Postop vital signs: stable    Dental Exam: Unchanged from Preop

## 2025-03-27 NOTE — PROGRESS NOTES
[facility] / [facility]  ECT Procedure Note    Ana Morales Patient Status:  Outpatient   Age/Gender 39 year old female MRN [id number]   Location [facility] POST ANESTHESIA CARE UNIT Attending Luis Alberto Casas MD   Hosp Day # 0 PCP None Pcp     3/27/2025    ECT Number: #1    Diagnosis: Major Depression Recurrent Severe Without Psychotic Features. F33.2    Type of ECT:  Bifrontal    Place of Service:  Inpatient    Settings:   1.  Energy Percentage: 70%    2.  Program:  Low 0.5    Pre-ECT Evaluation    Symptoms:  Patient seen.  Patient noted to have severe neurovegetative depression with periods of severe lack of functioning and difficulty with treatment failure with medications.  Patient shows capacity to make decisions.  The patient currently has no cognitive or physical complaints.  Patient with periods of decreased mood, helplessness, and periods of suicidal thoughts.  Patient agreeable for ECT.    Risk/Benefits:  Discussed with patient side effects of ECT including headache, teeth, jaw, cardiac, pulmonary, NPO, aspiration, allergic reactions, anesthetic reactions, musculoskeletal, neurologic, morbidity/mortality, potential lack of efficacy, unilateral/bilateral ECT, relapse/maintenance issues, cognitive risks including memory, concentration, cognition, and other risks.    Side Effects:  Patient notes no cognitive/physical complaints    Exam:  Mood: depressed and anxious  Affect: Congruent  Memory:  intact immediate, recent, remote and as evidenced by ability to present consistent history  Concentration:   fair  Suicidal ideation: Periods of suicidal thoughts    Prior to procedure, reviewed with treatment team correct patient, time of procedure and type of ECT.  Also reviewed with anesthesia pre-ECT medications.    Patient Monitored:  B/P, EKG, EEG, Pulse Ox, Left Ankle Cuff    Pre-ECT Medications: Robinul 0.1 mg IV, Toradol 15 mg IV, Zofran 4 mg IV, and caffeine 125 mg IV    ECT  Medications:  Anesthetic  Etomidate etomidate 16 mg IV followed by ketamine 25 mg IV and Succinylcholine 80 mg IV    Seizure Duration:  Motor: 42 seconds       EE seconds    Post-ECT Condition:  Treatment unremarkable    Post-ECT Medications:  Propofol 30 mg IV    Luis Alberto Casas MD

## 2025-03-27 NOTE — PROGRESS NOTES
[facility] / [facility]  ECT History & Physical    Ana Petra Morales Patient Status:  Outpatient   Age/Gender 39 year old female MRN [id number]   Location [facility] POST ANESTHESIA CARE UNIT Attending Luis Alberto Casas MD   Hosp Day # 0 PCP None Pcp     Date: 3/27/2025    Diagnosis: Major depression recurrent severe    Procedure:  ECT    HPI:     Patient seen.  Patient discussed ECT risks and benefits.  Patient noted no cognitive or physical complaints      Medical History:  Past Medical History:    ADHD    ADD    ANXIETY    Anxiety state    Attention deficit disorder    BACK PAIN    DEPRESSION    Depression    Visual impairment    glasses       Surgical History:  Past Surgical History:   Procedure Laterality Date    [place] teeth removed Bilateral 01/01/2010    Wrist fracture surgery Left        Family History:  Family History   Problem Relation Age of Onset    Other (Other) Father         sleep apnea    Psychiatric Mother         bipolar    Cancer Maternal Grandmother         stomach cancer    Cancer Paternal Grandfather         lung ca, 82       ROS:  Unremarkable    Physical Exam:   /83   Pulse 69   Temp 98.7 °F (37.1 °C) (Temporal)   Resp 21   Ht 68\"   Wt 92.6 kg (204 lb 3.2 oz)   LMP 02/25/2025 (Approximate)   SpO2 99%   BMI 31.05 kg/m²     General Appearance:    Alert, cooperative, no distress, appears stated age   Head:    Normocephalic, without obvious abnormality, atraumatic   Eyes:    PERRL, conjunctiva/corneas clear, EOM's intact       Nose:   Nares normal, septum midline, mucosa normal, no drainage    or sinus tenderness   Throat:   Lips, mucosa, and tongue normal; teeth and gums normal   Neck:   Supple, symmetrical, trachea midline   Lungs:     Clear to auscultation bilaterally, respirations unlabored    Heart:    Regular rate and rhythm, S1 and S2 normal, no murmur, rub   or gallop   Abdomen:     Soft, non-tender, bowel sounds active all four quadrants,     no masses, no  organomegaly   Extremities:   Extremities normal, atraumatic, no cyanosis or edema   Pulses:   2+ and symmetric all extremities   Skin:   Skin color, texture, turgor normal, no rashes or lesions   Neurologic:   CNII-XII intact, normal strength, sensation and reflexes     throughout     Impressions & Plans: Major depression recurrent severe.  Bifrontal ECT    I have discussed the risks and benefits and alternatives with the patient/family.  They understand and agree to proceed with plan of care.    Luis Alberto Casas MD

## 2025-03-27 NOTE — ANESTHESIA PREPROCEDURE EVALUATION
PRE-OP EVALUATION    Patient Name: Ana Morales    Admit Diagnosis: Major depressive disorder, recurrent, severe w/o psychotic behavior (HCC) [F33.2]    Pre-op Diagnosis: * No pre-op diagnosis entered *        Anesthesia Procedure: ECT(BEDSIDE)    * No surgeons found in log *    Pre-op vitals reviewed.  Temp: 98.8 °F (37.1 °C)  Pulse: 81  Resp: 22  BP: 110/81  SpO2: 97 %  Body mass index is 31.05 kg/m².    Current medications reviewed.  Hospital Medications:   lactated ringers infusion   Intravenous Continuous    [COMPLETED] glycopyrrolate (Robinul) 0.2 MG/ML injection 0.1 mg  0.1 mg Intravenous Once    [COMPLETED] ketorolac (Toradol) 30 MG/ML injection 15 mg  15 mg Intravenous Once    [COMPLETED] ondansetron (Zofran) 4 MG/2ML injection 4 mg  4 mg Intravenous Once    [COMPLETED] caffeine-sodium benzoate 125-125 mg/mL injection  125 mg Intravenous Once    [COMPLETED] ondansetron (Zofran) 4 MG/2ML injection        [COMPLETED] ketorolac (Toradol) 30 MG/ML injection        [COMPLETED] glycopyrrolate (Robinul) 0.2 MG/ML injection        [COMPLETED] caffeine-sodium benzoate 125-125 MG/ML injection        DULoxetine (Cymbalta) DR cap 40 mg  40 mg Oral Nightly    DULoxetine (Cymbalta) DR cap 60 mg  60 mg Oral Daily    lithium carbonate cap 150 mg  150 mg Oral Daily    lithium carbonate cap 300 mg  300 mg Oral Nightly    traZODone (Desyrel) tab 150 mg  150 mg Oral Nightly    [COMPLETED] acetaminophen (Tylenol Extra Strength) tab 1,000 mg  1,000 mg Oral See Admin Instructions    Cariprazine HCl (Vraylar) CAPS 3 mg **PATIENT SUPPLIED**  3 mg Oral Nightly    alum-mag hydroxide-simethicone (Maalox) 200-200-20 MG/5ML oral suspension 30 mL  30 mL Oral Q4H PRN    acetaminophen (Tylenol) tab 325 mg  325 mg Oral Q4H PRN    Or    acetaminophen (Tylenol) tab 650 mg  650 mg Oral Q4H PRN    prazosin (Minipress) cap 2 mg  2 mg Oral Nightly       Outpatient Medications:   Prescriptions Prior to Admission[1]    Allergies: Patient has  no known allergies.      Anesthesia Evaluation    Patient summary reviewed.    Anesthetic Complications           GI/Hepatic/Renal                                 Cardiovascular                (+) obesity                                       Endo/Other                                  Pulmonary                           Neuro/Psych      (+) depression  (+) anxiety                              Past Surgical History:   Procedure Laterality Date    [place] teeth removed Bilateral 2010    Wrist fracture surgery Left      Social History     Socioeconomic History    Marital status:    Tobacco Use    Smoking status: Former     Current packs/day: 0.00     Types: Cigarettes     Quit date: 2014     Years since quittin.2    Smokeless tobacco: Never   Vaping Use    Vaping status: Never Used   Substance and Sexual Activity    Alcohol use: Not Currently     Comment: relapsed on a bottle of vanilla extract last friday, otherwise none    Drug use: Not Currently    Sexual activity: Yes     Partners: Male     History   Drug Use Unknown     Available pre-op labs reviewed.  Lab Results   Component Value Date    WBC 8.3 2025    RBC 3.94 2025    HGB 13.0 2025    HCT 37.8 2025    MCV 95.9 2025    MCH 33.0 2025    MCHC 34.4 2025    RDW 12.2 2025    .0 2025     Lab Results   Component Value Date     2025    K 4.3 2025     2025    CO2 27.0 2025    BUN 14 2025    CREATSERUM 0.99 2025    GLU 84 2025    CA 9.7 2025            Airway      Mallampati: II  Mouth opening: 3 FB  TM distance: 4 - 6 cm  Neck ROM: full Cardiovascular    Cardiovascular exam normal.  Rhythm: regular  Rate: normal     Dental             Pulmonary    Pulmonary exam normal.                 Other findings              ASA: 2   Plan: general  NPO status verified and patient meets guidelines.          Plan/risks discussed with:  patient                Present on Admission:  **None**             [1] (Not in a hospital admission)

## 2025-03-31 ENCOUNTER — ANESTHESIA (OUTPATIENT)
Dept: POSTOP/PACU | Facility: HOSPITAL | Age: 40
End: 2025-03-31
Payer: COMMERCIAL

## 2025-03-31 ENCOUNTER — HOSPITAL ENCOUNTER (OUTPATIENT)
Dept: POSTOP/PACU | Facility: HOSPITAL | Age: 40
Discharge: HOME | End: 2025-03-31
Attending: Other
Payer: COMMERCIAL

## 2025-03-31 ENCOUNTER — ANESTHESIA EVENT (OUTPATIENT)
Dept: POSTOP/PACU | Facility: HOSPITAL | Age: 40
End: 2025-03-31
Payer: COMMERCIAL

## 2025-03-31 VITALS
SYSTOLIC BLOOD PRESSURE: 124 MMHG | DIASTOLIC BLOOD PRESSURE: 82 MMHG | TEMPERATURE: 99 F | BODY MASS INDEX: 30.92 KG/M2 | WEIGHT: 204 LBS | OXYGEN SATURATION: 94 % | HEIGHT: 68 IN | RESPIRATION RATE: 22 BRPM | HEART RATE: 90 BPM

## 2025-03-31 DIAGNOSIS — F33.2 MAJOR DEPRESSIVE DISORDER, RECURRENT, SEVERE W/O PSYCHOTIC BEHAVIOR (HCC): ICD-10-CM

## 2025-03-31 RX ORDER — GLYCOPYRROLATE 0.2 MG/ML
0.1 INJECTION, SOLUTION INTRAMUSCULAR; INTRAVENOUS ONCE
Status: COMPLETED | OUTPATIENT
Start: 2025-03-31 | End: 2025-03-31

## 2025-03-31 RX ORDER — CAFFEINE AND SODIUM BENZOATE 125 MG/ML
250 INJECTION, SOLUTION INTRAMUSCULAR; INTRAVENOUS ONCE
Status: COMPLETED | OUTPATIENT
Start: 2025-03-31 | End: 2025-03-31

## 2025-03-31 RX ORDER — ONDANSETRON 2 MG/ML
4 INJECTION INTRAMUSCULAR; INTRAVENOUS ONCE
Status: COMPLETED | OUTPATIENT
Start: 2025-03-31 | End: 2025-03-31

## 2025-03-31 RX ORDER — CAFFEINE AND SODIUM BENZOATE 125 MG/ML
INJECTION, SOLUTION INTRAMUSCULAR; INTRAVENOUS
Status: COMPLETED
Start: 2025-03-31 | End: 2025-03-31

## 2025-03-31 RX ORDER — GLYCOPYRROLATE 0.2 MG/ML
INJECTION, SOLUTION INTRAMUSCULAR; INTRAVENOUS
Status: COMPLETED
Start: 2025-03-31 | End: 2025-03-31

## 2025-03-31 RX ORDER — SODIUM CHLORIDE, SODIUM LACTATE, POTASSIUM CHLORIDE, CALCIUM CHLORIDE 600; 310; 30; 20 MG/100ML; MG/100ML; MG/100ML; MG/100ML
INJECTION, SOLUTION INTRAVENOUS CONTINUOUS
Status: DISCONTINUED | OUTPATIENT
Start: 2025-03-31 | End: 2025-03-31 | Stop reason: HOSPADM

## 2025-03-31 RX ORDER — ONDANSETRON 2 MG/ML
INJECTION INTRAMUSCULAR; INTRAVENOUS
Status: COMPLETED
Start: 2025-03-31 | End: 2025-03-31

## 2025-03-31 RX ORDER — ETOMIDATE 2 MG/ML
INJECTION INTRAVENOUS AS NEEDED
Status: DISCONTINUED | OUTPATIENT
Start: 2025-03-31 | End: 2025-03-31 | Stop reason: SURG

## 2025-03-31 RX ORDER — SODIUM CHLORIDE, SODIUM LACTATE, POTASSIUM CHLORIDE, CALCIUM CHLORIDE 600; 310; 30; 20 MG/100ML; MG/100ML; MG/100ML; MG/100ML
INJECTION, SOLUTION INTRAVENOUS CONTINUOUS
Status: DISCONTINUED | OUTPATIENT
Start: 2025-03-31 | End: 2025-04-02

## 2025-03-31 RX ORDER — NALOXONE HYDROCHLORIDE 0.4 MG/ML
0.08 INJECTION, SOLUTION INTRAMUSCULAR; INTRAVENOUS; SUBCUTANEOUS AS NEEDED
Status: DISCONTINUED | OUTPATIENT
Start: 2025-03-31 | End: 2025-03-31 | Stop reason: HOSPADM

## 2025-03-31 RX ORDER — KETOROLAC TROMETHAMINE 30 MG/ML
INJECTION, SOLUTION INTRAMUSCULAR; INTRAVENOUS
Status: COMPLETED
Start: 2025-03-31 | End: 2025-03-31

## 2025-03-31 RX ORDER — KETOROLAC TROMETHAMINE 30 MG/ML
15 INJECTION, SOLUTION INTRAMUSCULAR; INTRAVENOUS ONCE
Status: COMPLETED | OUTPATIENT
Start: 2025-03-31 | End: 2025-03-31

## 2025-03-31 RX ORDER — KETAMINE HYDROCHLORIDE 50 MG/ML
INJECTION, SOLUTION INTRAMUSCULAR; INTRAVENOUS AS NEEDED
Status: DISCONTINUED | OUTPATIENT
Start: 2025-03-31 | End: 2025-03-31 | Stop reason: SURG

## 2025-03-31 RX ADMIN — CAFFEINE AND SODIUM BENZOATE 250 MG: 125 INJECTION, SOLUTION INTRAMUSCULAR; INTRAVENOUS at 06:36:00

## 2025-03-31 RX ADMIN — GLYCOPYRROLATE 0.1 MG: 0.2 INJECTION, SOLUTION INTRAMUSCULAR; INTRAVENOUS at 06:21:00

## 2025-03-31 RX ADMIN — KETAMINE HYDROCHLORIDE 25 MG: 50 INJECTION, SOLUTION INTRAMUSCULAR; INTRAVENOUS at 07:12:00

## 2025-03-31 RX ADMIN — SODIUM CHLORIDE, SODIUM LACTATE, POTASSIUM CHLORIDE, CALCIUM CHLORIDE: 600; 310; 30; 20 INJECTION, SOLUTION INTRAVENOUS at 06:16:00

## 2025-03-31 RX ADMIN — ONDANSETRON 4 MG: 2 INJECTION INTRAMUSCULAR; INTRAVENOUS at 06:16:00

## 2025-03-31 RX ADMIN — KETOROLAC TROMETHAMINE 15 MG: 30 INJECTION, SOLUTION INTRAMUSCULAR; INTRAVENOUS at 06:20:00

## 2025-03-31 RX ADMIN — ETOMIDATE 16 MG: 2 INJECTION INTRAVENOUS at 07:12:00

## 2025-03-31 NOTE — PROGRESS NOTES
[facility] / [facility] [place]  ECT History & Physical    Ana Petra Springersandro Patient Status:  Outpatient   Age/Gender 39 year old female MRN [id number]   Location [facility] POST ANESTHESIA CARE UNIT Attending Luis Alberto Casas MD   Hosp Day # 0 PCP None Pcp     Date of Service: 3/31/2025    Diagnosis:  Major Depression Recurrent Severe Without Psychotic Features. F33.2    Procedure:  Bifrontal    HPI: Less depressed.  Complains of headache  jaw pain and muscle soreness      Medical History:  Past Medical History:    ADHD    ADD    ANXIETY    Anxiety state    Attention deficit disorder    BACK PAIN    DEPRESSION    Depression    Visual impairment    glasses       Surgical History:  Past Surgical History:   Procedure Laterality Date    [place] teeth removed Bilateral 2010    Wrist fracture surgery Left        Family History:  Family History   Problem Relation Age of Onset    Other (Other) Father         sleep apnea    Psychiatric Mother         bipolar    Cancer Maternal Grandmother         stomach cancer    Cancer Paternal Grandfather         lung ca, 82       Social History:  Social History     Socioeconomic History    Marital status:      Spouse name: Not on file    Number of children: Not on file    Years of education: Not on file    Highest education level: Not on file   Occupational History    Not on file   Tobacco Use    Smoking status: Former     Current packs/day: 0.00     Types: Cigarettes     Quit date: 2014     Years since quittin.2    Smokeless tobacco: Never   Vaping Use    Vaping status: Never Used   Substance and Sexual Activity    Alcohol use: Not Currently     Comment: relapsed on a bottle of vanilla extract last friday, otherwise none    Drug use: Not Currently    Sexual activity: Yes     Partners: Male   Other Topics Concern    Not on file   Social History Narrative    Not on file     Social Drivers of Health     Food Insecurity: No Food Insecurity (3/26/2025)    NCSS - Food  Insecurity     Worried About Running Out of Food in the Last Year: No     Ran Out of Food in the Last Year: No   Transportation Needs: No Transportation Needs (3/26/2025)    NCSS - Transportation     Lack of Transportation: No   Housing Stability: Not At Risk (3/26/2025)    NCSS - Housing/Utilities     Has Housing: Yes     Worried About Losing Housing: No     Unable to Get Utilities: No       ROS:  unremarkable    Physical Exam:   LMP 02/25/2025 (Approximate)     General Appearance:    Alert, cooperative, no distress, appears stated age   Head:    Normocephalic, without obvious abnormality, atraumatic   Eyes:    PERRL, conjunctiva/corneas clear, EOM's intact   Nose:   Nares normal, septum midline, mucosa normal, no drainage    or sinus tenderness   Throat:   Lips, mucosa, and tongue normal; teeth and gums normal   Neck:   Supple, symmetrical, trachea midline   Lungs:     Clear to auscultation bilaterally, respirations unlabored    Heart:    Regular rate and rhythm, S1 and S2 normal, no murmur, rub or gallop   Abdomen:     Soft, non-tender, bowel sounds active all four quadrants,     no masses, no organomegaly   Extremities:   Extremities normal, atraumatic, no cyanosis or edema   Pulses:   2+ and symmetric all extremities   Skin:   Skin color, texture, turgor normal, no rashes or lesions   Neurologic:   CNII-XII intact, normal strength, sensation and reflexes     throughout     Impressions & Plans:    Diagnosis:  Major Depression Recurrent Severe Without Psychotic Features. F33.2    Procedure:  Bifrontal    I have discussed the risks and benefits and alternatives with the patient/family.  They understand and agree to proceed with plan of care.    Shannon Salinas MD  3/31/2025

## 2025-03-31 NOTE — DISCHARGE INSTRUCTIONS
Discharge Instructions  Electroconvulsive Therapy    Activities:  You MUST arrange to have a responsible adult drive you home and have a responsible adult stay with you the rest of the day and overnight.  Do not drive today.  Do not operate any machinery today. Use kitchen equipment with caution.  Rest and take it easy today.  Do not take public transportation without the presence of another responsible adult for 24 hours    Medications:  Resume your regular medications when you get home  The nurse will instruct you not to take any NSAIDS (Advil, Aleve, Motrin, Ibuprofen) before 1 pm today because you were given a certain medication during the procedure.      Diet:  You may resume your regular diet when you get home  Do not drink alcohol for 24 hours    Additional Instructions:  Someone should call you to schedule any upcoming ECT treatments. Call as needed to schedule or cancel your ECT appointments [phone].  If you have any questions or concerns, please call your own psychiatrist.  For your safety, please do not wear make-up to any future ECT appointments.  For any questions regarding your ECT appointment, please call [facility] [phone].  For cancellations after hours, call [phone] and leave a message.    Expected Recovery:  As you awaken, you may experience one or more of the following:  Headache, nausea, temporary confusion, or muscle stiffness.  If these symptoms increase, become severe or are accompanied by a fever of more than 101, please seek medical attention.  The ECT may affect memory.  Many patients report loss of memory for events that occurred in the days, weeks or months surrounding the ECT.  Many of these memories may return, but not always.  Short-term memory may also be affected for months, but this can also be a result of the disorder that you have.

## 2025-03-31 NOTE — ANESTHESIA POSTPROCEDURE EVALUATION
[facility]    Ana Moraels Patient Status:  Outpatient   Age/Gender 39 year old female MRN [id number]   Location [facility] POST ANESTHESIA CARE UNIT Attending Luis Alberto Casas MD   Hosp Day # 0 PCP None Pcp       Anesthesia Post-op Note        Procedure Summary       Date: 03/31/25 Room / Location: [facility] Post Anesthesia Care Unit    Anesthesia Start: 0708 Anesthesia Stop: 0719    Procedure: ECT(BEDSIDE) Diagnosis: Major depressive disorder, recurrent, severe w/o psychotic behavior (HCC)    Scheduled Providers:  Anesthesiologist: Aaron Ordonez MD    Anesthesia Type: general ASA Status: 2            Anesthesia Type: general    Vitals Value Taken Time   /86 03/31/25 0721   Temp 98 °F (36.7 °C) 03/31/25 0721   Pulse 83 03/31/25 0721   Resp 14 03/31/25 0721   SpO2 100 % 03/31/25 0721           Patient Location: PACU    Anesthesia Type: general    Airway Patency: patent    Postop Pain Control: adequate    Mental Status: preanesthetic baseline    Nausea/Vomiting: none    Cardiopulmonary/Hydration status: stable euvolemic    Complications: no apparent anesthesia related complications    Postop vital signs: stable    Dental Exam: Unchanged from Preop    Patient to be discharged from PACU when criteria met.

## 2025-03-31 NOTE — PROGRESS NOTES
[facility] / [facility] [place]  ECT Procedure Note    Ana Morales Patient Status:  Outpatient   Age/Gender 39 year old female   MRN [id number]    Location [facility] POST ANESTHESIA CARE UNIT Attending No att. providers found   Hosp Day # 0 PCP None Pcp     ECT Number: #2    Diagnosis: Major Depression Recurrent Severe Without Psychotic Features. F33.2    Type of ECT:  Bifrontal    Place of Service:  Inpatient    Settings:   1.  Energy Percentage: 70%    2.  Program:  Low 0.5    Pre-ECT Evaluation    Symptoms:      Prior to procedure, reviewed with treatment team correct patient, time of procedure and type of ECT.  Also reviewed with anesthesia pre-ECT medications    Patient reports that she is feeling slightly less depressed and more hopeful after first treatment.  Patient notes decrease in suicidal thinking.  Patient complains of muscle and joint soreness, headache following ECT and medications will be adjusted today to help improve this.  Patient reports she is sleeping and eating.  No cognitive side effects    The patient continues to retain capacity to consent for ECT.    Risk/Benefits:  Discussed with patient side effects of ECT including headache, teeth, jaw, cardiac, pulmonary, NPO, aspiration, allergic reactions, anesthetic reactions, musculoskeletal, neurologic, morbidity/mortality, potential lack of efficacy, unilateral/bilateral ECT, relapse/maintenance issues, cognitive risks including memory, concentration, cognition, and other risks.    Side Effects:  Patient notes no cognitive/physical complaints, Headache, Jaw pain, and muscle tenderness    Exam:  Mood:  Slightly less depressed  Affect: Congruent  Memory:  intact immediate, recent, remote and as evidenced by ability to present consistent history  Concentration:   good and as assessed by  ability to concentrate on our conversation  Suicidal ideation: Decreased suicidal thoughts    Patient Monitored:  B/P, EKG, EEG, Pulse Ox,  Left Ankle Cuff    Pre-ECT Medications:  Robinul 0.1 mg IV, Toradol 15 mg IV, Zofran 4 mg IV, and caffeine 250  mg IV    ECT Medications:   Anesthetic  Etomidate etomidate 16 mg IV followed by ketamine 25 mg IV and Succinylcholine 70 mg IV     Seizure Duration:  Motor: 30 seconds       EE seconds    Post-ECT Condition:  Treatment unremarkable    ECT Medications: Propofol 30 mg IV    Shannon Salinas    3/31/2025

## 2025-03-31 NOTE — ANESTHESIA PREPROCEDURE EVALUATION
PRE-OP EVALUATION    Patient Name: Ana Morales    Admit Diagnosis: Major depressive disorder, recurrent, severe w/o psychotic behavior (HCC) [F33.2]    Pre-op Diagnosis: * No pre-op diagnosis entered *        Anesthesia Procedure: ECT(BEDSIDE)    * No surgeons found in log *    Pre-op vitals reviewed.  Temp: 98.5 °F (36.9 °C)  Pulse: 80  Resp: 24  BP: 116/89  SpO2: 99 %  Body mass index is 31.02 kg/m².    Current medications reviewed.  Hospital Medications:   lactated ringers infusion   Intravenous Continuous    [COMPLETED] glycopyrrolate (Robinul) 0.2 MG/ML injection 0.1 mg  0.1 mg Intravenous Once    [COMPLETED] ketorolac (Toradol) 30 MG/ML injection 15 mg  15 mg Intravenous Once    [COMPLETED] ondansetron (Zofran) 4 MG/2ML injection 4 mg  4 mg Intravenous Once    [COMPLETED] caffeine-sodium benzoate 125-125 mg/mL injection  250 mg Intravenous Once    [COMPLETED] acetaminophen (Tylenol Extra Strength) tab 1,000 mg  1,000 mg Oral Once    DULoxetine (Cymbalta) DR cap 40 mg  40 mg Oral Nightly    DULoxetine (Cymbalta) DR cap 60 mg  60 mg Oral Daily    lithium carbonate cap 150 mg  150 mg Oral Daily    lithium carbonate cap 300 mg  300 mg Oral Nightly    traZODone (Desyrel) tab 150 mg  150 mg Oral Nightly    Cariprazine HCl (Vraylar) CAPS 3 mg **PATIENT SUPPLIED**  3 mg Oral Nightly    alum-mag hydroxide-simethicone (Maalox) 200-200-20 MG/5ML oral suspension 30 mL  30 mL Oral Q4H PRN    acetaminophen (Tylenol) tab 325 mg  325 mg Oral Q4H PRN    Or    acetaminophen (Tylenol) tab 650 mg  650 mg Oral Q4H PRN    prazosin (Minipress) cap 2 mg  2 mg Oral Nightly       Outpatient Medications:   Prescriptions Prior to Admission[1]    Allergies: Patient has no known allergies.      Anesthesia Evaluation    Patient summary reviewed.    Anesthetic Complications           GI/Hepatic/Renal                                 Cardiovascular                (+) obesity                                       Endo/Other                                   Pulmonary                           Neuro/Psych      (+) depression  (+) anxiety                              Past Surgical History:   Procedure Laterality Date    [place] teeth removed Bilateral 2010    Wrist fracture surgery Left      Social History     Socioeconomic History    Marital status:    Tobacco Use    Smoking status: Former     Current packs/day: 0.00     Types: Cigarettes     Quit date: 2014     Years since quittin.2    Smokeless tobacco: Never   Vaping Use    Vaping status: Never Used   Substance and Sexual Activity    Alcohol use: Not Currently     Comment: relapsed on a bottle of vanilla extract last friday, otherwise none    Drug use: Not Currently    Sexual activity: Yes     Partners: Male     History   Drug Use Unknown     Available pre-op labs reviewed.  Lab Results   Component Value Date    WBC 8.3 2025    RBC 3.94 2025    HGB 13.0 2025    HCT 37.8 2025    MCV 95.9 2025    MCH 33.0 2025    MCHC 34.4 2025    RDW 12.2 2025    .0 2025     Lab Results   Component Value Date     2025    K 4.3 2025     2025    CO2 27.0 2025    BUN 14 2025    CREATSERUM 0.99 2025    GLU 84 2025    CA 9.7 2025            Airway      Mallampati: II  Mouth opening: 3 FB  TM distance: 4 - 6 cm  Neck ROM: full Cardiovascular    Cardiovascular exam normal.  Rhythm: regular  Rate: normal     Dental             Pulmonary    Pulmonary exam normal.                 Other findings              ASA: 2   Plan: general  NPO status verified and patient meets guidelines.          Plan/risks discussed with: patient                Present on Admission:  **None**           [1] (Not in a hospital admission)

## 2025-04-02 ENCOUNTER — ANESTHESIA (OUTPATIENT)
Dept: POSTOP/PACU | Facility: HOSPITAL | Age: 40
End: 2025-04-02
Payer: COMMERCIAL

## 2025-04-02 ENCOUNTER — HOSPITAL ENCOUNTER (OUTPATIENT)
Dept: POSTOP/PACU | Facility: HOSPITAL | Age: 40
Discharge: HOME | End: 2025-04-02
Attending: Other
Payer: COMMERCIAL

## 2025-04-02 ENCOUNTER — ANESTHESIA EVENT (OUTPATIENT)
Dept: POSTOP/PACU | Facility: HOSPITAL | Age: 40
End: 2025-04-02
Payer: COMMERCIAL

## 2025-04-02 VITALS
DIASTOLIC BLOOD PRESSURE: 97 MMHG | TEMPERATURE: 98 F | BODY MASS INDEX: 31 KG/M2 | SYSTOLIC BLOOD PRESSURE: 127 MMHG | WEIGHT: 204 LBS | HEART RATE: 97 BPM | RESPIRATION RATE: 19 BRPM | OXYGEN SATURATION: 100 %

## 2025-04-02 DIAGNOSIS — F33.2 MAJOR DEPRESSIVE DISORDER, RECURRENT, SEVERE W/O PSYCHOTIC BEHAVIOR (HCC): ICD-10-CM

## 2025-04-02 RX ORDER — INSULIN ASPART 100 [IU]/ML
INJECTION, SOLUTION INTRAVENOUS; SUBCUTANEOUS ONCE
Status: CANCELLED | OUTPATIENT
Start: 2025-04-02 | End: 2025-04-02

## 2025-04-02 RX ORDER — ETOMIDATE 2 MG/ML
INJECTION INTRAVENOUS AS NEEDED
Status: DISCONTINUED | OUTPATIENT
Start: 2025-04-02 | End: 2025-04-02 | Stop reason: SURG

## 2025-04-02 RX ORDER — KETOROLAC TROMETHAMINE 30 MG/ML
INJECTION, SOLUTION INTRAMUSCULAR; INTRAVENOUS
Status: COMPLETED
Start: 2025-04-02 | End: 2025-04-02

## 2025-04-02 RX ORDER — GLYCOPYRROLATE 0.2 MG/ML
INJECTION, SOLUTION INTRAMUSCULAR; INTRAVENOUS
Status: COMPLETED
Start: 2025-04-02 | End: 2025-04-02

## 2025-04-02 RX ORDER — HYDROMORPHONE HYDROCHLORIDE 1 MG/ML
0.4 INJECTION, SOLUTION INTRAMUSCULAR; INTRAVENOUS; SUBCUTANEOUS EVERY 5 MIN PRN
Status: CANCELLED | OUTPATIENT
Start: 2025-04-02 | End: 2025-04-02

## 2025-04-02 RX ORDER — ONDANSETRON 2 MG/ML
4 INJECTION INTRAMUSCULAR; INTRAVENOUS ONCE
Status: COMPLETED | OUTPATIENT
Start: 2025-04-02 | End: 2025-04-02

## 2025-04-02 RX ORDER — SODIUM CHLORIDE, SODIUM LACTATE, POTASSIUM CHLORIDE, CALCIUM CHLORIDE 600; 310; 30; 20 MG/100ML; MG/100ML; MG/100ML; MG/100ML
INJECTION, SOLUTION INTRAVENOUS CONTINUOUS
Status: DISCONTINUED | OUTPATIENT
Start: 2025-04-02 | End: 2025-04-04

## 2025-04-02 RX ORDER — KETAMINE HYDROCHLORIDE 50 MG/ML
INJECTION, SOLUTION INTRAMUSCULAR; INTRAVENOUS AS NEEDED
Status: DISCONTINUED | OUTPATIENT
Start: 2025-04-02 | End: 2025-04-02 | Stop reason: SURG

## 2025-04-02 RX ORDER — KETOROLAC TROMETHAMINE 30 MG/ML
15 INJECTION, SOLUTION INTRAMUSCULAR; INTRAVENOUS ONCE
Status: COMPLETED | OUTPATIENT
Start: 2025-04-02 | End: 2025-04-02

## 2025-04-02 RX ORDER — HYDROMORPHONE HYDROCHLORIDE 1 MG/ML
0.6 INJECTION, SOLUTION INTRAMUSCULAR; INTRAVENOUS; SUBCUTANEOUS EVERY 5 MIN PRN
Status: CANCELLED | OUTPATIENT
Start: 2025-04-02 | End: 2025-04-02

## 2025-04-02 RX ORDER — CAFFEINE AND SODIUM BENZOATE 125 MG/ML
250 INJECTION, SOLUTION INTRAMUSCULAR; INTRAVENOUS ONCE
Status: COMPLETED | OUTPATIENT
Start: 2025-04-02 | End: 2025-04-02

## 2025-04-02 RX ORDER — NALOXONE HYDROCHLORIDE 0.4 MG/ML
0.08 INJECTION, SOLUTION INTRAMUSCULAR; INTRAVENOUS; SUBCUTANEOUS AS NEEDED
Status: CANCELLED | OUTPATIENT
Start: 2025-04-02 | End: 2025-04-02

## 2025-04-02 RX ORDER — HYDROCODONE BITARTRATE AND ACETAMINOPHEN 5; 325 MG/1; MG/1
1 TABLET ORAL ONCE AS NEEDED
Status: CANCELLED | OUTPATIENT
Start: 2025-04-02 | End: 2025-04-02

## 2025-04-02 RX ORDER — LABETALOL HYDROCHLORIDE 5 MG/ML
5 INJECTION, SOLUTION INTRAVENOUS EVERY 5 MIN PRN
Status: CANCELLED | OUTPATIENT
Start: 2025-04-02 | End: 2025-04-02

## 2025-04-02 RX ORDER — HYDROMORPHONE HYDROCHLORIDE 1 MG/ML
0.2 INJECTION, SOLUTION INTRAMUSCULAR; INTRAVENOUS; SUBCUTANEOUS EVERY 5 MIN PRN
Status: CANCELLED | OUTPATIENT
Start: 2025-04-02 | End: 2025-04-02

## 2025-04-02 RX ORDER — GLYCOPYRROLATE 0.2 MG/ML
0.1 INJECTION, SOLUTION INTRAMUSCULAR; INTRAVENOUS ONCE
Status: COMPLETED | OUTPATIENT
Start: 2025-04-02 | End: 2025-04-02

## 2025-04-02 RX ORDER — ONDANSETRON 2 MG/ML
INJECTION INTRAMUSCULAR; INTRAVENOUS
Status: COMPLETED
Start: 2025-04-02 | End: 2025-04-02

## 2025-04-02 RX ORDER — CAFFEINE AND SODIUM BENZOATE 125 MG/ML
INJECTION, SOLUTION INTRAMUSCULAR; INTRAVENOUS
Status: COMPLETED
Start: 2025-04-02 | End: 2025-04-02

## 2025-04-02 RX ORDER — SODIUM CHLORIDE, SODIUM LACTATE, POTASSIUM CHLORIDE, CALCIUM CHLORIDE 600; 310; 30; 20 MG/100ML; MG/100ML; MG/100ML; MG/100ML
INJECTION, SOLUTION INTRAVENOUS CONTINUOUS
Status: CANCELLED | OUTPATIENT
Start: 2025-04-02

## 2025-04-02 RX ORDER — MIDAZOLAM HYDROCHLORIDE 1 MG/ML
1 INJECTION INTRAMUSCULAR; INTRAVENOUS EVERY 5 MIN PRN
Status: CANCELLED | OUTPATIENT
Start: 2025-04-02 | End: 2025-04-02

## 2025-04-02 RX ORDER — MEPERIDINE HYDROCHLORIDE 25 MG/ML
12.5 INJECTION INTRAMUSCULAR; INTRAVENOUS; SUBCUTANEOUS AS NEEDED
Status: CANCELLED | OUTPATIENT
Start: 2025-04-02

## 2025-04-02 RX ORDER — ACETAMINOPHEN 500 MG
1000 TABLET ORAL ONCE AS NEEDED
Status: CANCELLED | OUTPATIENT
Start: 2025-04-02 | End: 2025-04-02

## 2025-04-02 RX ORDER — HYDROCODONE BITARTRATE AND ACETAMINOPHEN 5; 325 MG/1; MG/1
2 TABLET ORAL ONCE AS NEEDED
Status: CANCELLED | OUTPATIENT
Start: 2025-04-02 | End: 2025-04-02

## 2025-04-02 RX ADMIN — GLYCOPYRROLATE 0.1 MG: 0.2 INJECTION, SOLUTION INTRAMUSCULAR; INTRAVENOUS at 07:02:00

## 2025-04-02 RX ADMIN — KETAMINE HYDROCHLORIDE 25 MG: 50 INJECTION, SOLUTION INTRAMUSCULAR; INTRAVENOUS at 07:57:00

## 2025-04-02 RX ADMIN — SODIUM CHLORIDE, SODIUM LACTATE, POTASSIUM CHLORIDE, CALCIUM CHLORIDE: 600; 310; 30; 20 INJECTION, SOLUTION INTRAVENOUS at 08:05:00

## 2025-04-02 RX ADMIN — SODIUM CHLORIDE, SODIUM LACTATE, POTASSIUM CHLORIDE, CALCIUM CHLORIDE: 600; 310; 30; 20 INJECTION, SOLUTION INTRAVENOUS at 06:56:00

## 2025-04-02 RX ADMIN — ONDANSETRON 4 MG: 2 INJECTION INTRAMUSCULAR; INTRAVENOUS at 06:56:00

## 2025-04-02 RX ADMIN — CAFFEINE AND SODIUM BENZOATE 250 MG: 125 INJECTION, SOLUTION INTRAMUSCULAR; INTRAVENOUS at 07:09:00

## 2025-04-02 RX ADMIN — KETOROLAC TROMETHAMINE 15 MG: 30 INJECTION, SOLUTION INTRAMUSCULAR; INTRAVENOUS at 07:01:00

## 2025-04-02 RX ADMIN — SODIUM CHLORIDE, SODIUM LACTATE, POTASSIUM CHLORIDE, CALCIUM CHLORIDE: 600; 310; 30; 20 INJECTION, SOLUTION INTRAVENOUS at 07:55:00

## 2025-04-02 RX ADMIN — ETOMIDATE 16 MG: 2 INJECTION INTRAVENOUS at 07:57:00

## 2025-04-02 NOTE — ANESTHESIA POSTPROCEDURE EVALUATION
[facility]    Ana Morales Patient Status:  Outpatient   Age/Gender 39 year old female MRN [id number]   Location [facility] POST ANESTHESIA CARE UNIT Attending Luis Alberto Casas MD   Hosp Day # 0 PCP None Pcp       Anesthesia Post-op Note        Procedure Summary       Date: 04/02/25 Room / Location: [facility] Post Anesthesia Care Unit    Anesthesia Start: 0755 Anesthesia Stop: 0805    Procedure: ECT(BEDSIDE) Diagnosis: Major depressive disorder, recurrent, severe w/o psychotic behavior (HCC)    Scheduled Providers:  Anesthesiologist: Hugh Mathews MD    Anesthesia Type: general ASA Status: Not recorded            Anesthesia Type: general    Vitals Value Taken Time   /79 04/02/25 0847   Temp  04/02/25 0847   Pulse 106 04/02/25 0847   Resp 17 04/02/25 0847   SpO2 100 % 04/02/25 0847   Vitals shown include unfiled device data.        Patient Location: PACU    Anesthesia Type: general    Airway Patency: patent    Postop Pain Control: adequate    Mental Status: preanesthetic baseline    Nausea/Vomiting: none    Cardiopulmonary/Hydration status: stable euvolemic    Complications: no apparent anesthesia related complications    Postop vital signs: stable    Dental Exam: Unchanged from Preop    Patient to be discharged from PACU when criteria met.

## 2025-04-02 NOTE — DISCHARGE INSTRUCTIONS
Discharge Instructions  Electroconvulsive Therapy    Activities:  You MUST arrange to have a responsible adult drive you home and have a responsible adult stay with you the rest of the day and overnight.  Do not drive today.  Do not operate any machinery today. Use kitchen equipment with caution.  Rest and take it easy today.  Do not take public transportation without the presence of another responsible adult for 24 hours    Medications:  Resume your regular medications when you get home  The nurse will instruct you not to take any NSAIDS (Advil, Aleve, Motrin, Ibuprofen) before 1 pm today because you were given a certain medication during the procedure.    Diet:  You may resume your regular diet when you get home  Do not drink alcohol for 24 hours    Additional Instructions:  Someone should call you to schedule any upcoming ECT treatments. Call as needed to schedule or cancel your ECT appointments [phone].  If you have any questions or concerns, please call your own psychiatrist.  For your safety, please do not wear make-up to any future ECT appointments.  For any questions regarding your ECT appointment, please call [facility] [phone].  For cancellations after hours, call [phone] and leave a message.    Expected Recovery:  As you awaken, you may experience one or more of the following:  Headache, nausea, temporary confusion, or muscle stiffness.  If these symptoms increase, become severe or are accompanied by a fever of more than 101, please seek medical attention.  The ECT may affect memory.  Many patients report loss of memory for events that occurred in the days, weeks or months surrounding the ECT.  Many of these memories may return, but not always.  Short-term memory may also be affected for months, but this can also be a result of the disorder that you have.

## 2025-04-02 NOTE — PROGRESS NOTES
[facility]  ECT History & Physical    Ana Morales Patient Status:  Outpatient   Age/Gender 39 year old female MRN [id number]   Location [facility] POST ANESTHESIA CARE UNIT Attending Luis Alberto Casas MD   Hosp Day # 0 PCP None Pcp     Date: 4/2/2025    Diagnosis:  ***    Procedure:  ECT    HPI:     ***      Medical History:  Past Medical History:    ADHD    ADD    ANXIETY    Anxiety state    Attention deficit disorder    BACK PAIN    DEPRESSION    Depression    Visual impairment    glasses       Surgical History:  Past Surgical History:   Procedure Laterality Date    [place] teeth removed Bilateral 01/01/2010    Wrist fracture surgery Left        Family History:  Family History   Problem Relation Age of Onset    Other (Other) Father         sleep apnea    Psychiatric Mother         bipolar    Cancer Maternal Grandmother         stomach cancer    Cancer Paternal Grandfather         lung ca, 82       ROS:  ***    Physical Exam:   /86   Pulse 95   Temp 98.1 °F (36.7 °C)   Resp 22   Wt 92.5 kg (204 lb)   LMP 02/25/2025 (Approximate)   SpO2 100%   BMI 31.02 kg/m²     General Appearance:    Alert, cooperative, no distress, appears stated age   Head:    Normocephalic, without obvious abnormality, atraumatic   Eyes:    PERRL, conjunctiva/corneas clear, EOM's intact       Nose:   Nares normal, septum midline, mucosa normal, no drainage    or sinus tenderness   Throat:   Lips, mucosa, and tongue normal; teeth and gums normal   Neck:   Supple, symmetrical, trachea midline   Lungs:     Clear to auscultation bilaterally, respirations unlabored    Heart:    Regular rate and rhythm, S1 and S2 normal, no murmur, rub   or gallop   Abdomen:     Soft, non-tender, bowel sounds active all four quadrants,     no masses, no organomegaly   Extremities:   Extremities normal, atraumatic, no cyanosis or edema   Pulses:   2+ and symmetric all extremities   Skin:   Skin color, texture, turgor normal, no  rashes or lesions   Neurologic:   CNII-XII intact, normal strength, sensation and reflexes     throughout     Impressions & Plans:  ***    I have discussed the risks and benefits and alternatives with the patient/family.  They understand and agree to proceed with plan of care.    Luis Alberto Casas MD

## 2025-04-02 NOTE — ANESTHESIA PREPROCEDURE EVALUATION
PRE-OP EVALUATION    Patient Name: Ana Morales    Admit Diagnosis: Major depressive disorder, recurrent, severe w/o psychotic behavior (HCC) [F33.2]    Pre-op Diagnosis: * No pre-op diagnosis entered *        Anesthesia Procedure: ECT(BEDSIDE)    * No surgeons found in log *    Pre-op vitals reviewed.  Temp: 98.1 °F (36.7 °C)  Pulse: 95  Resp: 22  BP: 119/86  SpO2: 100 %  Body mass index is 31.02 kg/m².    Current medications reviewed.  Hospital Medications:   [COMPLETED] acetaminophen (Tylenol Extra Strength) tab 1,000 mg  1,000 mg Oral Once    lactated ringers infusion   Intravenous Continuous    [COMPLETED] glycopyrrolate (Robinul) 0.2 MG/ML injection 0.1 mg  0.1 mg Intravenous Once    [COMPLETED] ketorolac (Toradol) 30 MG/ML injection 15 mg  15 mg Intravenous Once    [COMPLETED] ondansetron (Zofran) 4 MG/2ML injection 4 mg  4 mg Intravenous Once    [COMPLETED] caffeine-sodium benzoate 125-125 mg/mL injection  250 mg Intravenous Once    DULoxetine (Cymbalta) DR cap 40 mg  40 mg Oral Nightly    DULoxetine (Cymbalta) DR cap 60 mg  60 mg Oral Daily    lithium carbonate cap 150 mg  150 mg Oral Daily    lithium carbonate cap 300 mg  300 mg Oral Nightly    traZODone (Desyrel) tab 150 mg  150 mg Oral Nightly    Cariprazine HCl (Vraylar) CAPS 3 mg **PATIENT SUPPLIED**  3 mg Oral Nightly    alum-mag hydroxide-simethicone (Maalox) 200-200-20 MG/5ML oral suspension 30 mL  30 mL Oral Q4H PRN    acetaminophen (Tylenol) tab 325 mg  325 mg Oral Q4H PRN    Or    acetaminophen (Tylenol) tab 650 mg  650 mg Oral Q4H PRN    prazosin (Minipress) cap 2 mg  2 mg Oral Nightly       Outpatient Medications:   Prescriptions Prior to Admission[1]    Allergies: Patient has no known allergies.      Anesthesia Evaluation    Patient summary reviewed.    Anesthetic Complications           GI/Hepatic/Renal                                 Cardiovascular                (+) obesity                                       Endo/Other                                   Pulmonary                           Neuro/Psych      (+) depression  (+) anxiety                              Past Surgical History:   Procedure Laterality Date    [place] teeth removed Bilateral 2010    Wrist fracture surgery Left      Social History     Socioeconomic History    Marital status:    Tobacco Use    Smoking status: Former     Current packs/day: 0.00     Types: Cigarettes     Quit date: 2014     Years since quittin.2    Smokeless tobacco: Never   Vaping Use    Vaping status: Never Used   Substance and Sexual Activity    Alcohol use: Not Currently     Comment: relapsed on a bottle of vanilla extract last friday, otherwise none    Drug use: Not Currently    Sexual activity: Yes     Partners: Male     History   Drug Use Unknown     Available pre-op labs reviewed.  Lab Results   Component Value Date    WBC 8.3 2025    RBC 3.94 2025    HGB 13.0 2025    HCT 37.8 2025    MCV 95.9 2025    MCH 33.0 2025    MCHC 34.4 2025    RDW 12.2 2025    .0 2025     Lab Results   Component Value Date     2025    K 4.3 2025     2025    CO2 27.0 2025    BUN 14 2025    CREATSERUM 0.99 2025    GLU 84 2025    CA 9.7 2025            Airway      Mallampati: II  Mouth opening: 3 FB  TM distance: 4 - 6 cm  Neck ROM: full Cardiovascular    Cardiovascular exam normal.  Rhythm: regular  Rate: normal     Dental             Pulmonary    Pulmonary exam normal.                 Other findings              ASA: 2   Plan: general  NPO status verified and patient meets guidelines.          Plan/risks discussed with: patient                Present on Admission:  **None**         [1] (Not in a hospital admission)

## 2025-04-02 NOTE — PROGRESS NOTES
[facility] / [facility]  ECT Procedure Note    Ana Morales Patient Status:  Outpatient   Age/Gender 39 year old female MRN [id number]   Location [facility] POST ANESTHESIA CARE UNIT Attending Luis Alberto Casas MD   Hosp Day # 0 PCP None Pcp     2025    ECT Number: ***    Diagnosis: {[facility] ECT DIAGNOSIS NEW:7200}    Type of ECT:  {ECT TYPE:7199}    Place of Service:  {inpatient / outpatient:}    Settings:   1.  Energy Percentage: { ECT SETTINGS:4416}    2.  Program:  { ECT PROGRAM:4417}    Pre-ECT Evaluation    Symptoms:  ***    Risk/Benefits:  {[facility] ECT RISK/BENEFITS:4580}    Side Effects:  {[facility] ECT SIDE EFFECTS:4581}    Exam:  Mood: {LOMG MSE MOOD 2:7203}  Affect: {LOMG MSE 2 AFFECT:115680::\"Congruent\"}  Memory:  {LOMG MSE MEMORY:559272::\"intact immediate, recent, remote\"}  Concentration:   {:972267}  Suicidal ideation: {Suicidal ideation:499378}    Prior to procedure, reviewed with treatment team correct patient, time of procedure and type of ECT.  Also reviewed with anesthesia pre-ECT medications.    Patient Monitored:  { ECT MONITORIN::\"B/P, EKG, EEG, Pulse Ox, Left Ankle Cuff\"}    Pre-ECT Medications: {Pre-ECT medications:6091}    ECT Medications:  {ECT Medications:7202::\"Succinylcholine ***\"}    Seizure Duration:  Motor: *** seconds       EEG: *** seconds    Post-ECT Condition:  { POST ECT CONDITION:4424::\"Treatment unremarkable\"}    Post-ECT Medications:  { POST ECT MEDICATIONS:4425::\"Propofol ***\"}    Luis Alberto Casas MD            Etomidate 16 mg IV followed by ketamine 25 mg IV and Succinylcholine 70 mg IV    Seizure Duration:  Motor: 54 seconds       EE seconds    Post-ECT Condition:  Treatment unremarkable    Post-ECT Medications:  Propofol 30 mg IV    Luis Alberto Casas MD

## 2025-04-04 ENCOUNTER — HOSPITAL ENCOUNTER (OUTPATIENT)
Dept: POSTOP/PACU | Facility: HOSPITAL | Age: 40
Discharge: HOME | End: 2025-04-04
Attending: Other
Payer: COMMERCIAL

## 2025-04-04 ENCOUNTER — ANESTHESIA (OUTPATIENT)
Dept: POSTOP/PACU | Facility: HOSPITAL | Age: 40
End: 2025-04-04
Payer: COMMERCIAL

## 2025-04-04 ENCOUNTER — ANESTHESIA EVENT (OUTPATIENT)
Dept: POSTOP/PACU | Facility: HOSPITAL | Age: 40
End: 2025-04-04
Payer: COMMERCIAL

## 2025-04-04 VITALS
OXYGEN SATURATION: 100 % | DIASTOLIC BLOOD PRESSURE: 87 MMHG | HEIGHT: 68 IN | RESPIRATION RATE: 22 BRPM | BODY MASS INDEX: 31 KG/M2 | HEART RATE: 97 BPM | SYSTOLIC BLOOD PRESSURE: 142 MMHG | TEMPERATURE: 98 F

## 2025-04-04 VITALS — BODY MASS INDEX: 31 KG/M2 | HEIGHT: 68 IN

## 2025-04-04 DIAGNOSIS — F33.2 MAJOR DEPRESSIVE DISORDER, RECURRENT, SEVERE W/O PSYCHOTIC BEHAVIOR (HCC): ICD-10-CM

## 2025-04-04 RX ORDER — ACETAMINOPHEN 500 MG
1000 TABLET ORAL ONCE AS NEEDED
Status: DISCONTINUED | OUTPATIENT
Start: 2025-04-04 | End: 2025-04-04 | Stop reason: HOSPADM

## 2025-04-04 RX ORDER — KETOROLAC TROMETHAMINE 30 MG/ML
15 INJECTION, SOLUTION INTRAMUSCULAR; INTRAVENOUS ONCE
Status: COMPLETED | OUTPATIENT
Start: 2025-04-04 | End: 2025-04-04

## 2025-04-04 RX ORDER — INSULIN ASPART 100 [IU]/ML
INJECTION, SOLUTION INTRAVENOUS; SUBCUTANEOUS ONCE
Status: DISCONTINUED | OUTPATIENT
Start: 2025-04-04 | End: 2025-04-04 | Stop reason: HOSPADM

## 2025-04-04 RX ORDER — GLYCOPYRROLATE 0.2 MG/ML
0.1 INJECTION, SOLUTION INTRAMUSCULAR; INTRAVENOUS ONCE
Status: COMPLETED | OUTPATIENT
Start: 2025-04-04 | End: 2025-04-04

## 2025-04-04 RX ORDER — HYDROMORPHONE HYDROCHLORIDE 1 MG/ML
0.4 INJECTION, SOLUTION INTRAMUSCULAR; INTRAVENOUS; SUBCUTANEOUS EVERY 5 MIN PRN
Status: DISCONTINUED | OUTPATIENT
Start: 2025-04-04 | End: 2025-04-04 | Stop reason: HOSPADM

## 2025-04-04 RX ORDER — MEPERIDINE HYDROCHLORIDE 25 MG/ML
12.5 INJECTION INTRAMUSCULAR; INTRAVENOUS; SUBCUTANEOUS AS NEEDED
Status: DISCONTINUED | OUTPATIENT
Start: 2025-04-04 | End: 2025-04-04 | Stop reason: HOSPADM

## 2025-04-04 RX ORDER — HYDROMORPHONE HYDROCHLORIDE 1 MG/ML
0.2 INJECTION, SOLUTION INTRAMUSCULAR; INTRAVENOUS; SUBCUTANEOUS EVERY 5 MIN PRN
Status: DISCONTINUED | OUTPATIENT
Start: 2025-04-04 | End: 2025-04-04 | Stop reason: HOSPADM

## 2025-04-04 RX ORDER — SODIUM CHLORIDE, SODIUM LACTATE, POTASSIUM CHLORIDE, CALCIUM CHLORIDE 600; 310; 30; 20 MG/100ML; MG/100ML; MG/100ML; MG/100ML
INJECTION, SOLUTION INTRAVENOUS CONTINUOUS
Status: DISCONTINUED | OUTPATIENT
Start: 2025-04-04 | End: 2025-04-04 | Stop reason: HOSPADM

## 2025-04-04 RX ORDER — HYDROCODONE BITARTRATE AND ACETAMINOPHEN 5; 325 MG/1; MG/1
1 TABLET ORAL ONCE AS NEEDED
Status: DISCONTINUED | OUTPATIENT
Start: 2025-04-04 | End: 2025-04-04 | Stop reason: HOSPADM

## 2025-04-04 RX ORDER — HYDROCODONE BITARTRATE AND ACETAMINOPHEN 5; 325 MG/1; MG/1
2 TABLET ORAL ONCE AS NEEDED
Status: DISCONTINUED | OUTPATIENT
Start: 2025-04-04 | End: 2025-04-04 | Stop reason: HOSPADM

## 2025-04-04 RX ORDER — HYDROMORPHONE HYDROCHLORIDE 1 MG/ML
0.6 INJECTION, SOLUTION INTRAMUSCULAR; INTRAVENOUS; SUBCUTANEOUS EVERY 5 MIN PRN
Status: DISCONTINUED | OUTPATIENT
Start: 2025-04-04 | End: 2025-04-04 | Stop reason: HOSPADM

## 2025-04-04 RX ORDER — KETOROLAC TROMETHAMINE 30 MG/ML
INJECTION, SOLUTION INTRAMUSCULAR; INTRAVENOUS
Status: COMPLETED
Start: 2025-04-04 | End: 2025-04-04

## 2025-04-04 RX ORDER — KETAMINE HYDROCHLORIDE 50 MG/ML
INJECTION, SOLUTION INTRAMUSCULAR; INTRAVENOUS AS NEEDED
Status: DISCONTINUED | OUTPATIENT
Start: 2025-04-04 | End: 2025-04-04 | Stop reason: SURG

## 2025-04-04 RX ORDER — ONDANSETRON 2 MG/ML
INJECTION INTRAMUSCULAR; INTRAVENOUS
Status: COMPLETED
Start: 2025-04-04 | End: 2025-04-04

## 2025-04-04 RX ORDER — ONDANSETRON 2 MG/ML
4 INJECTION INTRAMUSCULAR; INTRAVENOUS ONCE
Status: COMPLETED | OUTPATIENT
Start: 2025-04-04 | End: 2025-04-04

## 2025-04-04 RX ORDER — SODIUM CHLORIDE, SODIUM LACTATE, POTASSIUM CHLORIDE, CALCIUM CHLORIDE 600; 310; 30; 20 MG/100ML; MG/100ML; MG/100ML; MG/100ML
INJECTION, SOLUTION INTRAVENOUS CONTINUOUS
Status: DISCONTINUED | OUTPATIENT
Start: 2025-04-04 | End: 2025-04-06

## 2025-04-04 RX ORDER — ETOMIDATE 2 MG/ML
INJECTION INTRAVENOUS AS NEEDED
Status: DISCONTINUED | OUTPATIENT
Start: 2025-04-04 | End: 2025-04-04 | Stop reason: SURG

## 2025-04-04 RX ORDER — NALOXONE HYDROCHLORIDE 0.4 MG/ML
80 INJECTION, SOLUTION INTRAMUSCULAR; INTRAVENOUS; SUBCUTANEOUS AS NEEDED
Status: DISCONTINUED | OUTPATIENT
Start: 2025-04-04 | End: 2025-04-04 | Stop reason: HOSPADM

## 2025-04-04 RX ORDER — MIDAZOLAM HYDROCHLORIDE 1 MG/ML
1 INJECTION INTRAMUSCULAR; INTRAVENOUS EVERY 5 MIN PRN
Status: DISCONTINUED | OUTPATIENT
Start: 2025-04-04 | End: 2025-04-04 | Stop reason: HOSPADM

## 2025-04-04 RX ORDER — GLYCOPYRROLATE 0.2 MG/ML
INJECTION, SOLUTION INTRAMUSCULAR; INTRAVENOUS
Status: COMPLETED
Start: 2025-04-04 | End: 2025-04-04

## 2025-04-04 RX ORDER — CAFFEINE AND SODIUM BENZOATE 125 MG/ML
250 INJECTION, SOLUTION INTRAMUSCULAR; INTRAVENOUS ONCE
Status: COMPLETED | OUTPATIENT
Start: 2025-04-04 | End: 2025-04-04

## 2025-04-04 RX ORDER — LABETALOL HYDROCHLORIDE 5 MG/ML
5 INJECTION, SOLUTION INTRAVENOUS EVERY 5 MIN PRN
Status: DISCONTINUED | OUTPATIENT
Start: 2025-04-04 | End: 2025-04-04 | Stop reason: HOSPADM

## 2025-04-04 RX ORDER — CAFFEINE AND SODIUM BENZOATE 125 MG/ML
INJECTION, SOLUTION INTRAMUSCULAR; INTRAVENOUS
Status: COMPLETED
Start: 2025-04-04 | End: 2025-04-04

## 2025-04-04 RX ORDER — NALOXONE HYDROCHLORIDE 0.4 MG/ML
0.08 INJECTION, SOLUTION INTRAMUSCULAR; INTRAVENOUS; SUBCUTANEOUS AS NEEDED
Status: DISCONTINUED | OUTPATIENT
Start: 2025-04-04 | End: 2025-04-04 | Stop reason: HOSPADM

## 2025-04-04 RX ADMIN — GLYCOPYRROLATE 0.1 MG: 0.2 INJECTION, SOLUTION INTRAMUSCULAR; INTRAVENOUS at 07:07:00

## 2025-04-04 RX ADMIN — CAFFEINE AND SODIUM BENZOATE 250 MG: 125 INJECTION, SOLUTION INTRAMUSCULAR; INTRAVENOUS at 07:34:00

## 2025-04-04 RX ADMIN — KETAMINE HYDROCHLORIDE 25 MG: 50 INJECTION, SOLUTION INTRAMUSCULAR; INTRAVENOUS at 07:51:00

## 2025-04-04 RX ADMIN — ONDANSETRON 4 MG: 2 INJECTION INTRAMUSCULAR; INTRAVENOUS at 07:10:00

## 2025-04-04 RX ADMIN — KETOROLAC TROMETHAMINE 15 MG: 30 INJECTION, SOLUTION INTRAMUSCULAR; INTRAVENOUS at 07:06:00

## 2025-04-04 RX ADMIN — SODIUM CHLORIDE, SODIUM LACTATE, POTASSIUM CHLORIDE, CALCIUM CHLORIDE: 600; 310; 30; 20 INJECTION, SOLUTION INTRAVENOUS at 06:09:00

## 2025-04-04 RX ADMIN — ETOMIDATE 16 MG: 2 INJECTION INTRAVENOUS at 07:51:00

## 2025-04-04 NOTE — ANESTHESIA PREPROCEDURE EVALUATION
PRE-OP EVALUATION    Patient Name: Ana Morales    Admit Diagnosis: Major depressive disorder, recurrent, severe w/o psychotic behavior (HCC) [F33.2]    Pre-op Diagnosis: * No pre-op diagnosis entered *        Anesthesia Procedure: ECT(BEDSIDE)    * No surgeons found in log *    Pre-op vitals reviewed.        Body mass index is 31.05 kg/m².    Current medications reviewed.  Hospital Medications:   [COMPLETED] acetaminophen (Tylenol Extra Strength) tab 1,000 mg  1,000 mg Oral Once    [COMPLETED] ondansetron (Zofran) 4 MG/2ML injection        [COMPLETED] ketorolac (Toradol) 30 MG/ML injection        [COMPLETED] glycopyrrolate (Robinul) 0.2 MG/ML injection        [COMPLETED] caffeine-sodium benzoate 125-125 MG/ML injection        DULoxetine (Cymbalta) DR cap 40 mg  40 mg Oral Nightly    DULoxetine (Cymbalta) DR cap 60 mg  60 mg Oral Daily    lithium carbonate cap 150 mg  150 mg Oral Daily    lithium carbonate cap 300 mg  300 mg Oral Nightly    traZODone (Desyrel) tab 150 mg  150 mg Oral Nightly    Cariprazine HCl (Vraylar) CAPS 3 mg **PATIENT SUPPLIED**  3 mg Oral Nightly    alum-mag hydroxide-simethicone (Maalox) 200-200-20 MG/5ML oral suspension 30 mL  30 mL Oral Q4H PRN    acetaminophen (Tylenol) tab 325 mg  325 mg Oral Q4H PRN    Or    acetaminophen (Tylenol) tab 650 mg  650 mg Oral Q4H PRN    prazosin (Minipress) cap 2 mg  2 mg Oral Nightly       Outpatient Medications:   Prescriptions Prior to Admission[1]    Allergies: Patient has no known allergies.      Anesthesia Evaluation    Patient summary reviewed.    Anesthetic Complications           GI/Hepatic/Renal                                 Cardiovascular                (+) obesity                                       Endo/Other                                  Pulmonary                           Neuro/Psych      (+) depression  (+) anxiety                              Past Surgical History:   Procedure Laterality Date    [place] teeth removed Bilateral  2010    Wrist fracture surgery Left      Social History     Socioeconomic History    Marital status:    Tobacco Use    Smoking status: Former     Current packs/day: 0.00     Types: Cigarettes     Quit date: 2014     Years since quittin.2    Smokeless tobacco: Never   Vaping Use    Vaping status: Never Used   Substance and Sexual Activity    Alcohol use: Not Currently     Comment: relapsed on a bottle of vanilla extract last friday, otherwise none    Drug use: Not Currently    Sexual activity: Yes     Partners: Male     History   Drug Use Unknown     Available pre-op labs reviewed.  Lab Results   Component Value Date    WBC 8.3 2025    RBC 3.94 2025    HGB 13.0 2025    HCT 37.8 2025    MCV 95.9 2025    MCH 33.0 2025    MCHC 34.4 2025    RDW 12.2 2025    .0 2025     Lab Results   Component Value Date     2025    K 4.3 2025     2025    CO2 27.0 2025    BUN 14 2025    CREATSERUM 0.99 2025    GLU 84 2025    CA 9.7 2025            Airway      Mallampati: II  Mouth opening: 3 FB  TM distance: 4 - 6 cm  Neck ROM: full Cardiovascular    Cardiovascular exam normal.  Rhythm: regular  Rate: normal     Dental             Pulmonary    Pulmonary exam normal.                 Other findings              ASA: 2   Plan: general  NPO status verified and patient meets guidelines.          Plan/risks discussed with: patient                Present on Admission:  **None**         [1] (Not in a hospital admission)

## 2025-04-04 NOTE — ANESTHESIA POSTPROCEDURE EVALUATION
[facility]    Ana Morales Patient Status:  Outpatient   Age/Gender 39 year old female MRN [id number]   Location [facility] POST ANESTHESIA CARE UNIT Attending Luis Alberto Casas MD   Hosp Day # 0 PCP None Pcp       Anesthesia Post-op Note        Procedure Summary       Date: 04/04/25 Room / Location: [facility] Post Anesthesia Care Unit    Anesthesia Start: 0750 Anesthesia Stop: 0802    Procedure: ECT(BEDSIDE) Diagnosis: Major depressive disorder, recurrent, severe w/o psychotic behavior (HCC)    Scheduled Providers: Jp Hart DO Anesthesiologist: Abimael Ruiz MD    Anesthesia Type: general ASA Status: 2            Anesthesia Type: general    Vitals Value Taken Time   /92 04/04/25 0839   Temp 98 °F (36.7 °C) 04/04/25 0840   Pulse 105 04/04/25 0842   Resp 27 04/04/25 0842   SpO2 100 % 04/04/25 0842   Vitals shown include unfiled device data.        Patient Location: PACU    Anesthesia Type: general    Airway Patency: patent    Postop Pain Control: adequate    Mental Status: mildly sedated but able to meaningfully participate in the post-anesthesia evaluation    Nausea/Vomiting: none    Cardiopulmonary/Hydration status: stable euvolemic    Complications: no apparent anesthesia related complications    Postop vital signs: stable    Dental Exam: Unchanged from Preop    Patient to be discharged from PACU when criteria met.

## 2025-04-08 ENCOUNTER — ANESTHESIA (OUTPATIENT)
Dept: POSTOP/PACU | Facility: HOSPITAL | Age: 40
End: 2025-04-08
Payer: COMMERCIAL

## 2025-04-08 ENCOUNTER — ANESTHESIA EVENT (OUTPATIENT)
Dept: POSTOP/PACU | Facility: HOSPITAL | Age: 40
End: 2025-04-08
Payer: COMMERCIAL

## 2025-04-08 RX ORDER — KETAMINE HYDROCHLORIDE 50 MG/ML
INJECTION, SOLUTION INTRAMUSCULAR; INTRAVENOUS AS NEEDED
Status: DISCONTINUED | OUTPATIENT
Start: 2025-04-08 | End: 2025-04-08 | Stop reason: SURG

## 2025-04-08 RX ORDER — ETOMIDATE 2 MG/ML
INJECTION INTRAVENOUS AS NEEDED
Status: DISCONTINUED | OUTPATIENT
Start: 2025-04-08 | End: 2025-04-08 | Stop reason: SURG

## 2025-04-08 RX ADMIN — ETOMIDATE 16 MG: 2 INJECTION INTRAVENOUS at 07:20:00

## 2025-04-08 RX ADMIN — KETAMINE HYDROCHLORIDE 25 MG: 50 INJECTION, SOLUTION INTRAMUSCULAR; INTRAVENOUS at 07:20:00

## 2025-04-08 NOTE — ANESTHESIA PREPROCEDURE EVALUATION
PRE-OP EVALUATION    Patient Name: Ana Morales    Admit Diagnosis: Major depressive disorder, recurrent, severe w/o psychotic behavior (HCC) [F33.2]    Pre-op Diagnosis: * No pre-op diagnosis entered *        Anesthesia Procedure: ECT(BEDSIDE)    * No surgeons found in log *    Pre-op vitals reviewed.  Temp: 98 °F (36.7 °C)  Pulse: 84  Resp: 20  BP: 137/99  SpO2: 98 %  Body mass index is 31.02 kg/m².    Current medications reviewed.  Hospital Medications:   lactated ringers infusion   Intravenous Continuous    [COMPLETED] glycopyrrolate (Robinul) 0.2 MG/ML injection 0.1 mg  0.1 mg Intravenous Once    [COMPLETED] ketorolac (Toradol) 30 MG/ML injection 15 mg  15 mg Intravenous Once    [COMPLETED] ondansetron (Zofran) 4 MG/2ML injection 4 mg  4 mg Intravenous Once    caffeine-sodium benzoate 125-125 mg/mL injection  250 mg Intravenous Once    [COMPLETED] ondansetron (Zofran) 4 MG/2ML injection        [COMPLETED] ketorolac (Toradol) 30 MG/ML injection        [COMPLETED] glycopyrrolate (Robinul) 0.2 MG/ML injection        [COMPLETED] caffeine-sodium benzoate 125-125 MG/ML injection           Outpatient Medications:   Prescriptions Prior to Admission[1]    Allergies: Patient has no known allergies.      Anesthesia Evaluation    Patient summary reviewed.    Anesthetic Complications           GI/Hepatic/Renal                                 Cardiovascular                (+) obesity                                       Endo/Other                                  Pulmonary                           Neuro/Psych      (+) depression  (+) anxiety                              Past Surgical History:   Procedure Laterality Date    [place] teeth removed Bilateral 2010    Wrist fracture surgery Left      Social History     Socioeconomic History    Marital status:    Tobacco Use    Smoking status: Former     Current packs/day: 0.00     Types: Cigarettes     Quit date: 2014     Years since quittin.2     Smokeless tobacco: Never   Vaping Use    Vaping status: Never Used   Substance and Sexual Activity    Alcohol use: Not Currently     Comment: relapsed on a bottle of vanilla extract last friday, otherwise none    Drug use: Not Currently    Sexual activity: Yes     Partners: Male     History   Drug Use Unknown     Available pre-op labs reviewed.  Lab Results   Component Value Date    WBC 8.3 03/26/2025    RBC 3.94 03/26/2025    HGB 13.0 03/26/2025    HCT 37.8 03/26/2025    MCV 95.9 03/26/2025    MCH 33.0 03/26/2025    MCHC 34.4 03/26/2025    RDW 12.2 03/26/2025    .0 03/26/2025     Lab Results   Component Value Date     03/26/2025    K 4.3 03/26/2025     03/26/2025    CO2 27.0 03/26/2025    BUN 14 03/26/2025    CREATSERUM 0.99 03/26/2025    GLU 84 03/26/2025    CA 9.7 03/26/2025            Airway      Mallampati: II  Mouth opening: 3 FB  TM distance: 4 - 6 cm  Neck ROM: full Cardiovascular    Cardiovascular exam normal.  Rhythm: regular  Rate: normal     Dental             Pulmonary    Pulmonary exam normal.                 Other findings              ASA: 2   Plan: general  NPO status verified and patient meets guidelines.          Plan/risks discussed with: patient                Present on Admission:  **None**         [1] (Not in a hospital admission)

## 2025-04-08 NOTE — ANESTHESIA POSTPROCEDURE EVALUATION
[facility]    Ana Morales Patient Status:  Outpatient   Age/Gender 39 year old female MRN [id number]   Location [facility] POST ANESTHESIA CARE UNIT Attending Luis Alberto Casas MD   Hosp Day # 0 PCP None Pcp       Anesthesia Post-op Note        Procedure Summary       Date: 04/08/25 Room / Location: [facility] Post Anesthesia Care Unit    Anesthesia Start: 0714 Anesthesia Stop: 0729    Procedure: ECT(BEDSIDE) Diagnosis: Major depressive disorder, recurrent, moderate (HCC)    Scheduled Providers:  Anesthesiologist: Aaron Ordonez MD    Anesthesia Type: general ASA Status: 2            Anesthesia Type: general    Vitals Value Taken Time   /90 04/08/25 0729   Temp 98 °F (36.7 °C) 04/08/25 0729   Pulse 93 04/08/25 0729   Resp 19 04/08/25 0729   SpO2 96 % 04/08/25 0729           Patient Location: PACU    Anesthesia Type: general    Airway Patency: patent    Postop Pain Control: adequate    Mental Status: mildly sedated but able to meaningfully participate in the post-anesthesia evaluation    Nausea/Vomiting: none    Cardiopulmonary/Hydration status: stable euvolemic    Complications: no apparent anesthesia related complications    Postop vital signs: stable    Dental Exam: Unchanged from Preop    Patient to be discharged from PACU when criteria met.

## 2025-04-09 NOTE — PROGRESS NOTES
[facility] / [facility]  ECT Procedure Note    Ana Morales Patient Status:  Outpatient   Age/Gender 39 year old female MRN [id number]   Location [facility] POST ANESTHESIA CARE UNIT Attending No att. providers found   Hosp Day # 0 PCP None Pcp     4/8/2025    ECT Number: #4    Diagnosis: Major Depression Recurrent Severe Without Psychotic Features. F33.2    Type of ECT:  Bifrontal    Place of Service:  Outpatient    Settings:   1.  Energy Percentage: 75%    2.  Program:  Low 0.5    Pre-ECT Evaluation    Symptoms:  Patient seen.  Overall patient reports positive stabilization of mood.  Patient reports increased level of hope.  Patient noted no suicidal thoughts.  Patient reports no agitation or psychosis.  Patient noted decrease in anxiety.  Patient shows no cognitive physical complaints.  Patient shows capacity to make decisions.  Discussed treatment options.  Plan on scheduling 2 ECT next week though if she has been doing better, consideration for canceling second ECT and going to weekly.    Risk/Benefits:  Discussed with patient side effects of ECT including headache, teeth, jaw, cardiac, pulmonary, NPO, aspiration, allergic reactions, anesthetic reactions, musculoskeletal, neurologic, morbidity/mortality, potential lack of efficacy, unilateral/bilateral ECT, relapse/maintenance issues, cognitive risks including memory, concentration, cognition, and other risks.    Side Effects:  Patient notes no cognitive/physical complaints    Exam:  Mood: less depressed and less anxious  Affect: Congruent  Memory:  intact immediate, recent, remote and as evidenced by ability to present consistent history  Concentration:   good  Suicidal ideation: no suicidal ideation    Prior to procedure, reviewed with treatment team correct patient, time of procedure and type of ECT.  Also reviewed with anesthesia pre-ECT medications.    Patient Monitored:  B/P, EKG, EEG, Pulse Ox, Left Ankle Cuff    Pre-ECT  Medications: Robinul 0.1 mg IV, Toradol 15 mg IV, Zofran 4 mg IV, and Caffeine 250 mg IV    ECT Medications:  Anesthetic  Etomidate 16 mg IV followed by ketamine 25 mg IV and Succinylcholine 70 mg IV    Seizure Duration:  Motor: 52 seconds       EE seconds    Post-ECT Condition:  Treatment unremarkable    Post-ECT Medications:  Propofol 30 mg IV    Luis Alberto Casas MD

## 2025-04-09 NOTE — PROGRESS NOTES
[facility]  ECT History & Physical    Ana Morales Patient Status:  Outpatient   Age/Gender 39 year old female MRN [id number]   Location [facility] POST ANESTHESIA CARE UNIT Attending No att. providers found   Hosp Day # 0 PCP None Pcp     Date: 4/8/2025    Diagnosis: Major depression recurrent severe    Procedure:  ECT    HPI:     Patient seen.  Patient reports no cognitive or physical complaints.  Patient reports positive transition to outpatient.        Medical History:  Past Medical History:    ADHD    ADD    ANXIETY    Anxiety state    Attention deficit disorder    BACK PAIN    DEPRESSION    Depression    Visual impairment    glasses       Surgical History:  Past Surgical History:   Procedure Laterality Date    [place] teeth removed Bilateral 01/01/2010    Wrist fracture surgery Left        Family History:  Family History   Problem Relation Age of Onset    Other (Other) Father         sleep apnea    Psychiatric Mother         bipolar    Cancer Maternal Grandmother         stomach cancer    Cancer Paternal Grandfather         lung ca, 82       ROS:  Unremarkable    Physical Exam:   /87   Pulse 97   Temp 98 °F (36.7 °C)   Resp 22   Ht 68\"   LMP 02/25/2025 (Approximate)   SpO2 100%   BMI 31.05 kg/m²     General Appearance:    Alert, cooperative, no distress, appears stated age   Head:    Normocephalic, without obvious abnormality, atraumatic   Eyes:    PERRL, conjunctiva/corneas clear, EOM's intact       Nose:   Nares normal, septum midline, mucosa normal, no drainage    or sinus tenderness   Throat:   Lips, mucosa, and tongue normal; teeth and gums normal   Neck:   Supple, symmetrical, trachea midline   Lungs:     Clear to auscultation bilaterally, respirations unlabored    Heart:    Regular rate and rhythm, S1 and S2 normal, no murmur, rub   or gallop   Abdomen:     Soft, non-tender, bowel sounds active all four quadrants,     no masses, no organomegaly   Extremities:    Extremities normal, atraumatic, no cyanosis or edema   Pulses:   2+ and symmetric all extremities   Skin:   Skin color, texture, turgor normal, no rashes or lesions   Neurologic:   CNII-XII intact, normal strength, sensation and reflexes     throughout     Impressions & Plans: Major depression recurrent severe.  Bifrontal ECT    I have discussed the risks and benefits and alternatives with the patient/family.  They understand and agree to proceed with plan of care.    Luis Alberto Casas MD

## 2025-04-10 ENCOUNTER — HOSPITAL ENCOUNTER (OUTPATIENT)
Dept: POSTOP/PACU | Facility: HOSPITAL | Age: 40
Discharge: HOME | End: 2025-04-09
Attending: Other
Payer: COMMERCIAL

## 2025-04-10 RX ORDER — ONDANSETRON 2 MG/ML
4 INJECTION INTRAMUSCULAR; INTRAVENOUS ONCE
OUTPATIENT
Start: 2025-04-10 | End: 2025-04-10

## 2025-04-10 RX ORDER — SODIUM CHLORIDE, SODIUM LACTATE, POTASSIUM CHLORIDE, CALCIUM CHLORIDE 600; 310; 30; 20 MG/100ML; MG/100ML; MG/100ML; MG/100ML
INJECTION, SOLUTION INTRAVENOUS CONTINUOUS
OUTPATIENT
Start: 2025-04-10

## 2025-04-10 RX ORDER — GLYCOPYRROLATE 0.2 MG/ML
0.1 INJECTION, SOLUTION INTRAMUSCULAR; INTRAVENOUS ONCE
OUTPATIENT
Start: 2025-04-10 | End: 2025-04-10

## 2025-04-10 RX ORDER — KETOROLAC TROMETHAMINE 30 MG/ML
15 INJECTION, SOLUTION INTRAMUSCULAR; INTRAVENOUS ONCE
OUTPATIENT
Start: 2025-04-10 | End: 2025-04-10

## 2025-04-10 RX ORDER — CAFFEINE AND SODIUM BENZOATE 125 MG/ML
250 INJECTION, SOLUTION INTRAMUSCULAR; INTRAVENOUS ONCE
OUTPATIENT
Start: 2025-04-10 | End: 2025-04-10

## 2025-07-07 ENCOUNTER — HOSPITAL ENCOUNTER (OUTPATIENT)
Age: 40
Discharge: HOME | End: 2025-07-07
Payer: COMMERCIAL

## 2025-07-07 VITALS
WEIGHT: 185 LBS | OXYGEN SATURATION: 98 % | BODY MASS INDEX: 29.03 KG/M2 | HEIGHT: 67 IN | RESPIRATION RATE: 18 BRPM | TEMPERATURE: 99 F | DIASTOLIC BLOOD PRESSURE: 85 MMHG | SYSTOLIC BLOOD PRESSURE: 121 MMHG | HEART RATE: 77 BPM

## 2025-07-07 DIAGNOSIS — S01.511A LIP LACERATION, INITIAL ENCOUNTER: Primary | ICD-10-CM

## 2025-07-07 PROCEDURE — 12011 RPR F/E/E/N/L/M 2.5 CM/<: CPT | Performed by: NURSE PRACTITIONER

## 2025-07-07 RX ORDER — PROPRANOLOL HYDROCHLORIDE 10 MG/1
10 TABLET ORAL 2 TIMES DAILY
COMMUNITY

## 2025-07-07 RX ORDER — DEXTROMETHORPHAN HYDROBROMIDE, BUPROPION HYDROCHLORIDE 105; 45 MG/1; MG/1
TABLET, MULTILAYER, EXTENDED RELEASE ORAL
COMMUNITY
Start: 2025-06-11

## 2025-07-07 RX ORDER — CEPHALEXIN 500 MG/1
500 CAPSULE ORAL 3 TIMES DAILY
Qty: 21 CAPSULE | Refills: 0 | Status: SHIPPED | OUTPATIENT
Start: 2025-07-07 | End: 2025-07-14

## 2025-07-10 NOTE — ED PROVIDER NOTES
Patient Seen in: [facility]        History  Chief Complaint   Patient presents with    Laceration     Stated Complaint: Lip Lac    Subjective:   HPI    Patient is a 39-year-old female who is here today with complaints of a lower lip laceration sustained after falling on a laundry basket just prior to arrival.  Patient reports that she tripped and fell, hitting her face on the laundry basket when she fell.  Patient has a T shaped laceration to the lower lip, left side.  There is no significant bleeding at this time.  Patient is up-to-date on tetanus.      Objective:     Past Medical History:    ADHD    ADD    ANXIETY    Anxiety state    Attention deficit disorder    BACK PAIN    DEPRESSION    Depression    Visual impairment    glasses              Past Surgical History:   Procedure Laterality Date    [place] teeth removed Bilateral 01/01/2010    Wrist fracture surgery Left                 No pertinent social history.            Review of Systems    Positive for stated complaint: Lip Lac  Other systems are as noted in HPI.  Constitutional and vital signs reviewed.      All other systems reviewed and negative except as noted above.                  Physical Exam    ED Triage Vitals   BP 07/07/25 1843 121/85   Pulse 07/07/25 1843 77   Resp 07/07/25 1843 18   Temp 07/07/25 1901 98.7 °F (37.1 °C)   Temp src 07/07/25 1901 Oral   SpO2 07/07/25 1843 98 %   O2 Device 07/07/25 1843 None (Room air)       Current Vitals:   No data recorded        Physical Exam  VS: Vital signs reviewed. O2 saturation within normal limits for this patient     General: Patient is awake and alert, oriented to person, place and time. Not in acute distress.      HEENT: Head is normocephalic atraumatic. Pupils reactive bilaterally.  EOMs intact.      Lungs: No respiratory distress no    Extremities: No edema.  Pulses 2+ extremities.   Brisk capillary refill noted.      Skin: Normal skin turgor, patient has a T shaped laceration to the left  lower lip.  There is no significant bleeding.    CNS: Moves all 4 extremities.  Interacts appropriately.  No tremor.  No gait abnormality              ED Course  Labs Reviewed - No data to display       I have personally  reviewed available prior medical records for any recent pertinent discharge summaries/testing. Patient/family updated on results and plan, a verbalized understanding and agreement with the plan.  I explained to the patient that emergent conditions may arise and to go to the ER for new, worsening or any persistent conditions. I've explained the importance of taking all medicatons as prescribed, follow up, and return precuations,  All questions answered.    Please note that this report has been produced using speech recognition software and may contain errors related to that system including, but not limited to, errors in grammar, punctuation, and spelling, as well as words and phrases that possibly may have been recognized inappropriately.  If there are any questions or concerns, contact the dictating provider for clarification.                  MDM     Patient presents for laceration.  Wound is clean, no foreign body identified.  Tetanus is up-to-date.  Incision is superficial, was closed with sutures.  Patient was instructed on suture care.  There is good wound approximation and no bleeding noted after the procedure.  Oxygen saturation is 99% on room air which is normal for this patient.  Patient has no other physical complaints.  Patient was instructed on need for follow-up with PCP and return to ED precautions    Laceration repair:    Verbal consent was obtained from the patient.  The left lower lip had a 1  cm  T- Shapped laceration located that  was anesthetized in the usual fashion. The wound was scrubbed, draped and explored.   There were no deep structures involved.  No tendon injury was identified.  The wound was repaired with 6  vicryl sutures  .  The wound repair was simple.  The  procedure was performed by myself.        Medical Decision Making      Disposition and Plan     Clinical Impression:  1. Lip laceration, initial encounter         Disposition:  Discharge  7/7/2025  7:04 pm    Follow-up:  No follow-up provider specified.        Medications Prescribed:  Discharge Medication List as of 7/7/2025  7:15 PM        START taking these medications    Details   cephALEXin 500 MG Oral Cap Take 1 capsule (500 mg total) by mouth 3 (three) times daily for 7 days., Normal, Disp-21 capsule, R-0                   Supplementary Documentation: